# Patient Record
Sex: MALE | Race: WHITE | NOT HISPANIC OR LATINO | Employment: OTHER | URBAN - METROPOLITAN AREA
[De-identification: names, ages, dates, MRNs, and addresses within clinical notes are randomized per-mention and may not be internally consistent; named-entity substitution may affect disease eponyms.]

---

## 2017-11-26 ENCOUNTER — OFFICE VISIT (OUTPATIENT)
Dept: URGENT CARE | Facility: CLINIC | Age: 52
End: 2017-11-26
Payer: COMMERCIAL

## 2017-11-26 PROCEDURE — 99203 OFFICE O/P NEW LOW 30 MIN: CPT

## 2017-11-26 PROCEDURE — G0463 HOSPITAL OUTPT CLINIC VISIT: HCPCS

## 2017-12-06 ENCOUNTER — ALLSCRIPTS OFFICE VISIT (OUTPATIENT)
Dept: OTHER | Facility: OTHER | Age: 52
End: 2017-12-06

## 2017-12-08 ENCOUNTER — GENERIC CONVERSION - ENCOUNTER (OUTPATIENT)
Dept: OTHER | Facility: OTHER | Age: 52
End: 2017-12-08

## 2017-12-08 DIAGNOSIS — R35.1 NOCTURIA: ICD-10-CM

## 2018-01-18 NOTE — PROGRESS NOTES
Assessment    1  Conjunctivitis, both eyes (372 30) (H10 9)   2  Cellulitis of skin (682 9) (L03 90)    Plan  Cellulitis of skin    · Amoxicillin-Pot Clavulanate 875-125 MG Oral Tablet; TAKE 1 TABLET EVERY 12  HOURS DAILY   · Tobramycin 0 3 % Ophthalmic Solution (Tobrex); INSTILL 1 DROP INTO AFFECTED  EYE(S) 4 TIMES DAILY    Discussion/Summary  Discussion Summary:   Patient is wife were given specific instructions with respect to the use of the antibiotics and eye drops  Patient will return if cellulitis on the face advances any further  Medication Side Effects Reviewed: Possible side effects of new medications were reviewed with the patient/guardian today  Understands and agrees with treatment plan: The treatment plan was reviewed with the patient/guardian  The patient/guardian understands and agrees with the treatment plan   Counseling Documentation With Imm: The patient, patient's family was counseled regarding instructions for management, prognosis, patient and family education  Follow Up Instructions: Follow Up with your Primary Care Provider in 3-4 days  If your symptoms worsen, go to the nearest [place] [address] Emergency Department  Chief Complaint    1  Eye Pain  Chief Complaint Free Text Note Form: Pt c/o left eye pain for three days  History of Present Illness  HPI: Patient is a 50-year-old male accompanied by his wife who was volunteering at the [facility] [place] cleaning the [place] section out and developed a redness on his lower eyelid on the left now on the right which is extending to the left cheek area of his face  The area is itchy and occasionally painful if pressure is applied on the left he has had no drainage or matting at the present time  He is up-to-date on his tetanus less than 5 years ago   Hospital Based Practices Required Assessment:   Pain Assessment   the patient states they do not have pain  Abuse And Domestic Violence Screen    Yes, the patient is safe at home   The patient states no one is hurting them  Depression And Suicide Screen  No, the patient has not had thoughts of hurting themself  No, the patient has not felt depressed in the past 7 days  Prefered Language is  [place]  Primary Language is  English  Readiness To Learn: Receptive  Barriers To Learning: none  Education Completed: disease/condition   Teaching Method: verbal   Person Taught: patient   Evaluation Of Learning: verbalized/demonstrated understanding      Review of Systems  Focused-Male:   Constitutional: no fever or chills, feels well, no tiredness, no recent weight loss or weight gain, no fever, not feeling poorly, no chills and not feeling tired  ENT: as noted in HPI  Cardiovascular: no complaints of slow or fast heart rate, no chest pain, no palpitations, no leg claudication or lower extremity edema  Respiratory: no complaints of shortness of breath, no wheezing or cough, no dyspnea on exertion, no orthopnea or PND  Gastrointestinal: no complaints of abdominal pain, no constipation, no nausea or vomiting, no diarrhea or bloody stools  Musculoskeletal: no complaints of arthralgia, no myalgia, no joint swelling or stiffness, no limb pain or swelling  Integumentary: no complaints of skin rash or lesion, no itching or dry skin, no skin wounds  Neurological: no complaints of headache, no confusion, no numbness or tingling, no dizziness or fainting  ROS Reviewed:   ROS reviewed  Past Medical History  Active Problems And Past Medical History Reviewed: The active problems and past medical history were reviewed and updated today  Family History  Family History Reviewed: The family history was reviewed and updated today  Social History  Social History Reviewed: The social history was reviewed and updated today  Surgical History  Surgical History Reviewed: The surgical history was reviewed and updated today  Current Meds   1   No Reported Medications Recorded    Allergies    1  No Known Drug Allergies    Vitals  Signs   Recorded: [id number] 03:34PM   Temperature: 97 3 F  Heart Rate: 75  Respiration: 18  Systolic: 787  Diastolic: 72  Weight: 636 lb 15 52 oz  O2 Saturation: 97    Physical Exam    Constitutional   General appearance: No acute distress, well appearing and well nourished  Eyes   Conjunctiva and lids: Abnormal   right eye is within normal limits other than the fact that the conjunctiva are cobblestoned and reddened left eye itself is normal however the conjunctiva is extremely hyperemic boggy with extension of this cellulitis to the left upper cheek  Pupils and irises: Equal, round and reactive to light  Ears, Nose, Mouth, and Throat   External inspection of ears and nose: Normal     Otoscopic examination: Tympanic membrance translucent with normal light reflex  Canals patent without erythema  Nasal mucosa, septum, and turbinates: Normal without edema or erythema  Oropharynx: Normal with no erythema, edema, exudate or lesions  Pulmonary   Auscultation of lungs: Clear to auscultation  Cardiovascular   Auscultation of heart: Normal rate and rhythm, normal S1 and S2, without murmurs  Lymphatic   Palpation of lymph nodes in neck: Abnormal   mild lymphadenopathy is noted bilaterally  Skin   Skin and subcutaneous tissue: Abnormal   see above     Psychiatric   Orientation to person, place and time: Normal     Mood and affect: Normal        Signatures   Electronically signed by : Chanell Lau DO; Nov 26 2017  3:50PM EST                       (Author)

## 2018-01-23 VITALS
WEIGHT: 230.38 LBS | BODY MASS INDEX: 30.53 KG/M2 | SYSTOLIC BLOOD PRESSURE: 124 MMHG | HEART RATE: 72 BPM | TEMPERATURE: 96.9 F | HEIGHT: 73 IN | OXYGEN SATURATION: 97 % | DIASTOLIC BLOOD PRESSURE: 82 MMHG

## 2018-01-23 NOTE — MISCELLANEOUS
Message   Recorded as Task   Date: 12/08/2017 03:57 PM, Created By: Damián Weaver   Task Name: Miscellaneous   Assigned To: Yordy TOVAR,TEAM   Regarding Patient: Tamara Nieto, Status: Active   CommentBeverely Spoon - 08 Dec 2017 3:57 PM     TASK CREATED  Caller: Self; [phone] (Home)  Please mail updated psa order to home address   Angela Shirley - 08 Dec 2017 3:59 PM     TASK EDITED  MAILED        Active Problems    1  Cellulitis of skin (682 9) (L03 90)   2  Conjunctivitis, both eyes (372 30) (H10 9)    Current Meds   1  Amoxicillin-Pot Clavulanate 875-125 MG Oral Tablet; TAKE 1 TABLET EVERY 12   HOURS DAILY; Therapy: [id number] to (Evaluate:64Mji4298)  Requested for: 94ZEN7522; Last   Rx:26Nov2017 Ordered   2  Tobramycin 0 3 % Ophthalmic Solution (Tobrex); INSTILL 1 DROP INTO AFFECTED   EYE(S) 4 TIMES DAILY; Therapy: 15PJT6668 to (Evaluate:42Jeu8796)  Requested for: 49UHF0064; Last   Rx:26Nov2017 Ordered    Allergies    1  No Known Drug Allergies    2  No Known Environmental Allergies   3   No Known Food Allergies    Signatures   Electronically signed by : Gustavo Kumar, ; Dec  8 2017  3:59PM EST                       (Author)

## 2018-05-05 ENCOUNTER — OFFICE VISIT (OUTPATIENT)
Dept: URGENT CARE | Facility: CLINIC | Age: 53
End: 2018-05-05
Payer: COMMERCIAL

## 2018-05-05 VITALS
HEART RATE: 66 BPM | SYSTOLIC BLOOD PRESSURE: 133 MMHG | BODY MASS INDEX: 29.03 KG/M2 | RESPIRATION RATE: 16 BRPM | DIASTOLIC BLOOD PRESSURE: 74 MMHG | WEIGHT: 220 LBS | OXYGEN SATURATION: 97 % | TEMPERATURE: 97.4 F

## 2018-05-05 DIAGNOSIS — S30.861A TICK BITE OF ABDOMEN, INITIAL ENCOUNTER: Primary | ICD-10-CM
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
## 2018-05-05 PROCEDURE — 99213 OFFICE O/P EST LOW 20 MIN: CPT | Performed by: PHYSICIAN ASSISTANT

## 2018-05-05 PROCEDURE — G0463 HOSPITAL OUTPT CLINIC VISIT: HCPCS | Performed by: PHYSICIAN ASSISTANT

## 2018-05-05 RX ORDER — DOXYCYCLINE HYCLATE 100 MG/1
CAPSULE ORAL
Qty: 2 CAPSULE | Refills: 0 | Status: SHIPPED | OUTPATIENT
Start: 2018-05-05 | End: 2018-05-10

## 2018-05-05 NOTE — PROGRESS NOTES
[facility]    NAME: Jairon Carranza is a 46 y o  male  : 1965    MRN: [id number]  DATE: May 5, 2018  TIME: 5:26 PM    Assessment and Plan   Tick bite of abdomen, initial encounter [Y26 318E, W57  XXXA]  1  Tick bite of abdomen, initial encounter  doxycycline hyclate (VIBRAMYCIN) 100 mg capsule   Foreign body removal  Date/Time: 2018 5:25 PM  Performed by: Concha Rogers  Authorized by: Concha Rogers   Consent: Verbal consent obtained  Consent given by: patient  Patient understanding: patient states understanding of the procedure being performed  Body area: skin  General location: trunk  Location details: abdomen  Removal mechanism: forceps  Depth: subcutaneous  Complexity: simple  1 objects recovered  Objects recovered: Tick  Post-procedure assessment: foreign body removed  Patient tolerance: Patient tolerated the procedure well with no immediate complications        Patient Instructions     Patient Instructions   Take doxycycline as directed  If you develop any systemic systems, follow up with PCP for further treatment  Chief Complaint     Chief Complaint   Patient presents with    Tick Removal     back, x 2 days       History of Present Illness   50-year-old male here with tick bite left hip/abdomen area  Patient noticed it this morning when he got out of the shower  He has been outside in the woods  Denies any joint pain, fever chills, rash  Patient was treated for Lyme disease in the past         Review of Systems   Review of Systems   Constitutional: Negative for activity change, appetite change, chills, diaphoresis, fatigue, fever and unexpected weight change  HENT: Negative for congestion, ear pain, hearing loss, sinus pressure, sneezing, sore throat and tinnitus  Eyes: Negative for photophobia, redness and visual disturbance  Respiratory: Negative for apnea, cough, chest tightness, shortness of breath, wheezing and stridor      Cardiovascular: Negative for chest pain, palpitations and leg swelling  Gastrointestinal: Negative for abdominal distention, abdominal pain, blood in stool, constipation, diarrhea, nausea and vomiting  Endocrine: Negative for cold intolerance, heat intolerance, polydipsia, polyphagia and polyuria  Genitourinary: Negative for difficulty urinating, dysuria, flank pain, hematuria and urgency  Musculoskeletal: Negative for arthralgias, back pain, gait problem, myalgias, neck pain and neck stiffness  Skin: Positive for wound  Negative for rash  Neurological: Negative for dizziness, tremors, seizures, syncope, weakness, light-headedness, numbness and headaches  Hematological: Negative for adenopathy  Does not bruise/bleed easily  Psychiatric/Behavioral: Negative for agitation, behavioral problems, confusion and decreased concentration  The patient is not nervous/anxious  All other systems reviewed and are negative  Current Medications     Current Outpatient Prescriptions:     doxycycline hyclate (VIBRAMYCIN) 100 mg capsule, Take 2 tablets at one time  , Disp: 2 capsule, Rfl: 0    Current Allergies     Allergies as of 05/05/2018    (No Known Allergies)          The following portions of the patient's history were reviewed and updated as appropriate: allergies, current medications, past family history, past medical history, past social history, past surgical history and problem list    History reviewed  No pertinent past medical history  No past surgical history on file  No family history on file  Medications have been verified  Objective   /74   Pulse 66   Temp (!) 97 4 °F (36 3 °C)   Resp 16   Wt 99 8 kg (220 lb)   SpO2 97%   BMI 29 03 kg/m²      Physical Exam   Physical Exam   Constitutional: He appears well-developed and well-nourished  Cardiovascular: Normal rate, regular rhythm and normal heart sounds      Pulmonary/Chest: Effort normal and breath sounds normal    Skin:

## 2018-05-05 NOTE — PATIENT INSTRUCTIONS
Take doxycycline as directed  If you develop any systemic systems, follow up with PCP for further treatment

## 2019-09-02 NOTE — PROGRESS NOTES
Assessment    1  Acute bacterial conjunctivitis of left eye (372 03) (H10 32)    Plan  Acute bacterial conjunctivitis of left eye    · Start: TobraDex 0 3-0 1 % Ophthalmic Ointment; APPLY 1/2 RIBBON TO lowerconjuncival sac of left eye twice daily  Health Maintenance    · *VB-Depression Screening; Status:Complete;   Done: 81JFP6525 09:46AM    Discussion/Summary  Discussion Summary:   Left eye conjunctivitis- change to opthalmic ointment  Apply as directed  Counseling Documentation With Imm: The patient was counseled regarding  Medication SE Review and Pt Understands Tx: Possible side effects of new medications were reviewed with the patient/guardian today  The treatment plan was reviewed with the patient/guardian  The patient/guardian understands and agrees with the treatment plan   Self Referrals:   Self Referrals: No      Chief Complaint  Chief Complaint Free Text Note Form: Patient seen in office today for a new patient exam to establish care  Patient stated that he recently was 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 and cleaning cat cages when some of the fluid went into his left eye  Patient then went to urgent care in [place] and was prescribed Augmentin and eye drops and feels that he should be on them a little longer  History of Present Illness  HPI: Pt was doing volunteer work the month of November at the [facility]  He was cleaning cat cages and splashed dirty water into his left eye  After about a week later, he noticed redness and swelling of te lower sac of his left eye  He went to Urgent Care and was given Augmentin and Tobradex  He finished today  He still noticed some redness inside the lower lid of his left eye  Review of Systems  Complete-Male:  Constitutional: No fever or chills, feels well, no tiredness, no recent weight gain or weight loss  Eyes: as noted in HPI   ENT: no complaints of earache, no hearing loss, no nosebleeds, no nasal discharge, no sore throat, no hoarseness  Cardiovascular: No complaints of slow heart rate, no fast heart rate, no chest pain, no palpitations, no leg claudication, no lower extremity  Respiratory: No complaints of shortness of breath, no wheezing, no cough, no SOB on exertion, no orthopnea or PND  Gastrointestinal: No complaints of abdominal pain, no constipation, no nausea or vomiting, no diarrhea or bloody stools  Past Medical History  Active Problems And Past Medical History Reviewed: The active problems and past medical history were reviewed and updated today  Surgical History  1  History of Back Surgery  2  History of Foot Surgery  3  History of Knee Surgery  Surgical History Reviewed: The surgical history was reviewed and updated today  Family History  Mother   1  Family history of diabetes mellitus (V18 0) (Z83 3)  Father   2  Family history of cardiac disorder (V17 49) (Z82 49)  Family History Reviewed: The family history was reviewed and updated today  Social History     · Never a smoker  Social History Reviewed: The social history was reviewed and updated today  The social history was reviewed and is unchanged  Current Meds  1  Ibuprofen 800 MG Oral Tablet; Therapy: (Recorded:20Vlr4624) to Recorded    Allergies  1  No Known Drug Allergies    Vitals  Vital Signs    Recorded: 39CVG8888 09:01AM   Temperature 96 9 F   Heart Rate 72   Systolic 883   Diastolic 82   Height 6 ft 1 in   Weight 230 lb 6 08 oz   BMI Calculated 30 39   BSA Calculated 2 28   O2 Saturation 97       Physical Exam   Constitutional  General appearance: No acute distress, well appearing and well nourished  Eyes  Conjunctiva and lids: Abnormal  -- left lower conjunctival sac injected with small ulceration present  Pupils and irises: Equal, round and reactive to light  Ears, Nose, Mouth, and Throat  External inspection of ears and nose: Normal    Otoscopic examination: Tympanic membrance translucent with normal light reflex   Canals patent without erythema  Nasal mucosa, septum, and turbinates: Normal without edema or erythema  Oropharynx: Normal with no erythema, edema, exudate or lesions  Pulmonary  Respiratory effort: No increased work of breathing or signs of respiratory distress  Auscultation of lungs: Clear to auscultation, equal breath sounds bilaterally, no wheezes, no rales, no rhonci  Cardiovascular  Auscultation of heart: Normal rate and rhythm, normal S1 and S2, without murmurs  Abdomen  Abdomen: Non-tender, no masses  Lymphatic  Palpation of lymph nodes in neck: No lymphadenopathy  Musculoskeletal  Gait and station: Normal    Psychiatric  Orientation to person, place and time: Normal    Mood and affect: Normal          Results/Data  PHQ-9 Adult Depression Screening 11Vsa2355 09:46AM User, [facility]     Test Name Result Flag Reference   PHQ-9 Adult Depression Score 1       Over the last two weeks, how often have you been bothered by any of the following problems? Little interest or pleasure in doing things: Not at all - 0 Feeling down, depressed, or hopeless: Not at all - 0 Trouble falling or staying asleep, or sleeping too much: Several days - 1 Feeling tired or having little energy: Not at all - 0 Poor appetite or over eating: Not at all - 0 Feeling bad about yourself - or that you are a failure or have let yourself or your family down: Not at all - 0 Trouble concentrating on things, such as reading the newspaper or watching television: Not at all - 0 Moving or speaking so slowly that other people could have noticed   Or the opposite -  being so fidgety or restless that you have been moving around a lot more than usual: Not at all - 0 Thoughts that you would be better off dead, or of hurting yourself in some way: Not at all - 0   PHQ-9 Adult Depression Screening Negative     PHQ-9 Difficulty Level Not difficult at all     PHQ-9 Severity Minimal Depression       PHQ-2 Adult Depression Screening 32CEY6254 09:46AM User, [facility] Test Name Result Flag Reference   PHQ-2 Adult Depression Score 0       Over the last two weeks, how often have you been bothered by any of the following problems?  Little interest or pleasure in doing things: Not at all - 0 Feeling down, depressed, or hopeless: Not at all - 0   PHQ-2 Adult Depression Screening Negative       *VB-Depression Screening 81RUG1168 09:46AM Evelin Velásquez     Test Name Result Flag Reference   Depression Scale Result      Depression Screen - Negative For Symptoms         Signatures   Electronically signed by : Javad Rubio NP; Dec  6 2017  9:29AM EST                       (Author)    Electronically signed by : Melly Ruvalcaba MD; Dec  6 2017 12:36PM EST                       (Co-author)    Electronically signed by : Melly Ruvalcaba MD; Dec  6 2017 12:36PM EST                       (Co-author) aching

## 2020-11-03 ENCOUNTER — LAB (OUTPATIENT)
Dept: LAB | Facility: CLINIC | Age: 55
End: 2020-11-03
Payer: COMMERCIAL

## 2020-11-03 ENCOUNTER — TRANSCRIBE ORDERS (OUTPATIENT)
Dept: ADMINISTRATIVE | Facility: HOSPITAL | Age: 55
End: 2020-11-03

## 2020-11-03 DIAGNOSIS — B18.2 CHRONIC HEPATITIS C WITHOUT HEPATIC COMA (HCC): ICD-10-CM

## 2020-11-03 DIAGNOSIS — B18.2 CHRONIC HEPATITIS C WITHOUT HEPATIC COMA (HCC): Primary | ICD-10-CM

## 2020-11-03 LAB
ALBUMIN SERPL BCP-MCNC: 4.5 G/DL (ref 3.5–5)
ALP SERPL-CCNC: 49 U/L (ref 46–116)
ALT SERPL W P-5'-P-CCNC: 56 U/L (ref 12–78)
ANION GAP SERPL CALCULATED.3IONS-SCNC: 4 MMOL/L (ref 4–13)
AST SERPL W P-5'-P-CCNC: 23 U/L (ref 5–45)
BASOPHILS # BLD AUTO: 0.05 THOUSANDS/ΜL (ref 0–0.1)
BASOPHILS NFR BLD AUTO: 1 % (ref 0–1)
BILIRUB SERPL-MCNC: 0.77 MG/DL (ref 0.2–1)
BUN SERPL-MCNC: 17 MG/DL (ref 5–25)
CALCIUM SERPL-MCNC: 9.5 MG/DL (ref 8.3–10.1)
CHLORIDE SERPL-SCNC: 106 MMOL/L (ref 100–108)
CO2 SERPL-SCNC: 30 MMOL/L (ref 21–32)
CREAT SERPL-MCNC: 0.84 MG/DL (ref 0.6–1.3)
EOSINOPHIL # BLD AUTO: 0.01 THOUSAND/ΜL (ref 0–0.61)
EOSINOPHIL NFR BLD AUTO: 0 % (ref 0–6)
ERYTHROCYTE [DISTWIDTH] IN BLOOD BY AUTOMATED COUNT: 13 % (ref 11.6–15.1)
GFR SERPL CREATININE-BSD FRML MDRD: 98 ML/MIN/1.73SQ M
GLUCOSE SERPL-MCNC: 113 MG/DL (ref 65–140)
HCT VFR BLD AUTO: 46.6 % (ref 36.5–49.3)
HGB BLD-MCNC: 15.8 G/DL (ref 12–17)
IMM GRANULOCYTES # BLD AUTO: 0.04 THOUSAND/UL (ref 0–0.2)
IMM GRANULOCYTES NFR BLD AUTO: 1 % (ref 0–2)
LYMPHOCYTES # BLD AUTO: 3.34 THOUSANDS/ΜL (ref 0.6–4.47)
LYMPHOCYTES NFR BLD AUTO: 45 % (ref 14–44)
MCH RBC QN AUTO: 30.6 PG (ref 26.8–34.3)
MCHC RBC AUTO-ENTMCNC: 33.9 G/DL (ref 31.4–37.4)
MCV RBC AUTO: 90 FL (ref 82–98)
MONOCYTES # BLD AUTO: 0.57 THOUSAND/ΜL (ref 0.17–1.22)
MONOCYTES NFR BLD AUTO: 8 % (ref 4–12)
NEUTROPHILS # BLD AUTO: 3.34 THOUSANDS/ΜL (ref 1.85–7.62)
NEUTS SEG NFR BLD AUTO: 45 % (ref 43–75)
NRBC BLD AUTO-RTO: 0 /100 WBCS
PLATELET # BLD AUTO: 195 THOUSANDS/UL (ref 149–390)
PMV BLD AUTO: 11.6 FL (ref 8.9–12.7)
POTASSIUM SERPL-SCNC: 3.9 MMOL/L (ref 3.5–5.3)
PROT SERPL-MCNC: 8 G/DL (ref 6.4–8.2)
RBC # BLD AUTO: 5.17 MILLION/UL (ref 3.88–5.62)
SODIUM SERPL-SCNC: 140 MMOL/L (ref 136–145)
WBC # BLD AUTO: 7.35 THOUSAND/UL (ref 4.31–10.16)

## 2020-11-03 PROCEDURE — 83883 ASSAY NEPHELOMETRY NOT SPEC: CPT

## 2020-11-03 PROCEDURE — 84460 ALANINE AMINO (ALT) (SGPT): CPT

## 2020-11-03 PROCEDURE — 83010 ASSAY OF HAPTOGLOBIN QUANT: CPT

## 2020-11-03 PROCEDURE — 82247 BILIRUBIN TOTAL: CPT

## 2020-11-03 PROCEDURE — 87522 HEPATITIS C REVRS TRNSCRPJ: CPT

## 2020-11-03 PROCEDURE — 36415 COLL VENOUS BLD VENIPUNCTURE: CPT

## 2020-11-03 PROCEDURE — 82172 ASSAY OF APOLIPOPROTEIN: CPT

## 2020-11-03 PROCEDURE — 85025 COMPLETE CBC W/AUTO DIFF WBC: CPT

## 2020-11-03 PROCEDURE — 80053 COMPREHEN METABOLIC PANEL: CPT

## 2020-11-03 PROCEDURE — 82977 ASSAY OF GGT: CPT

## 2020-11-06 LAB
A2 MACROGLOB SERPL-MCNC: 297 MG/DL (ref 110–276)
ALT SERPL W P-5'-P-CCNC: 46 IU/L (ref 0–55)
APO A-I SERPL-MCNC: 140 MG/DL (ref 101–178)
BILIRUB SERPL-MCNC: 0.5 MG/DL (ref 0–1.2)
COMMENT: ABNORMAL
FIBROSIS SCORING:: ABNORMAL
FIBROSIS STAGE SERPL QL: ABNORMAL
GGT SERPL-CCNC: 38 IU/L (ref 0–65)
HAPTOGLOB SERPL-MCNC: 132 MG/DL (ref 29–370)
HCV RNA SERPL NAA+PROBE-ACNC: NORMAL IU/ML
INTERPRETATIONS: ABNORMAL
LIVER FIBR SCORE SERPL CALC.FIBROSURE: 0.47 (ref 0–0.21)
NECROINFLAMM ACTIVITY SCORING:: ABNORMAL
NECROINFLAMMATORY ACT GRADE SERPL QL: ABNORMAL
NECROINFLAMMATORY ACT SCORE SERPL: 0.31 (ref 0–0.17)
SERVICE CMNT-IMP: ABNORMAL
TEST INFORMATION: NORMAL

## 2021-03-23 ENCOUNTER — OFFICE VISIT (OUTPATIENT)
Dept: INTERNAL MEDICINE CLINIC | Facility: CLINIC | Age: 56
End: 2021-03-23
Payer: COMMERCIAL

## 2021-03-23 VITALS
TEMPERATURE: 97.9 F | HEART RATE: 84 BPM | WEIGHT: 246.6 LBS | DIASTOLIC BLOOD PRESSURE: 76 MMHG | BODY MASS INDEX: 33.4 KG/M2 | HEIGHT: 72 IN | SYSTOLIC BLOOD PRESSURE: 130 MMHG | RESPIRATION RATE: 16 BRPM | OXYGEN SATURATION: 97 %

## 2021-03-23 DIAGNOSIS — Z00.00 MEDICARE ANNUAL WELLNESS VISIT, SUBSEQUENT: Primary | ICD-10-CM

## 2021-03-23 DIAGNOSIS — G89.29 CHRONIC BILATERAL BACK PAIN, UNSPECIFIED BACK LOCATION: ICD-10-CM

## 2021-03-23 DIAGNOSIS — M54.9 CHRONIC BILATERAL BACK PAIN, UNSPECIFIED BACK LOCATION: ICD-10-CM

## 2021-03-23 PROCEDURE — 99215 OFFICE O/P EST HI 40 MIN: CPT | Performed by: NURSE PRACTITIONER

## 2021-03-23 PROCEDURE — G0439 PPPS, SUBSEQ VISIT: HCPCS | Performed by: NURSE PRACTITIONER

## 2021-03-23 RX ORDER — IBUPROFEN 800 MG/1
600 TABLET ORAL EVERY 6 HOURS PRN
COMMUNITY

## 2021-03-23 NOTE — PATIENT INSTRUCTIONS

## 2021-03-23 NOTE — ASSESSMENT & PLAN NOTE
· Previous T12 fractures in the past with fusion  · Order xrays to verify placement of prosthetics  · dexa scan ordered

## 2021-03-23 NOTE — PROGRESS NOTES
Assessment and Plan:     Problem List Items Addressed This Visit        Other    Medicare annual wellness visit, subsequent - Primary     · Lifestyle modification, diet and exercise discussed  · Will order labs         Relevant Orders    CBC and differential (Completed)    Comprehensive metabolic panel (Completed)    Lipid panel (Completed)    Chronic bilateral back pain     · Previous T12 fractures in the past with fusion  · Order xrays to verify placement of prosthetics  · dexa scan ordered         Relevant Orders    XR spine cervical complete 4 or 5 vw non injury    XR spine lumbar minimum 4 views non injury    XR spine thoracic 3 vw    DXA body comp analysis           Preventive health issues were discussed with patient, and age appropriate screening tests were ordered as noted in patient's After Visit Summary  Personalized health advice and appropriate referrals for health education or preventive services given if needed, as noted in patient's After Visit Summary  History of Present Illness:     Patient presents for Welcome to Medicare visit  Patient Care Team:  Demetri Mcginnis as PCP - General (Internal Medicine)     Review of Systems:     Review of Systems   Constitutional: Negative for activity change, chills, fatigue and fever  HENT: Negative for rhinorrhea and sore throat  Eyes: Negative for pain  Respiratory: Negative for cough and shortness of breath  Cardiovascular: Negative for chest pain, palpitations and leg swelling  Gastrointestinal: Negative for abdominal pain, constipation, diarrhea, nausea and vomiting  Genitourinary: Negative for difficulty urinating, flank pain, frequency and urgency  Musculoskeletal: Negative for gait problem, joint swelling and myalgias  Skin: Negative for color change  Neurological: Negative for dizziness, weakness, light-headedness and headaches  Psychiatric/Behavioral: Negative for sleep disturbance   The patient is not nervous/anxious  All other systems reviewed and are negative       Problem List:     Patient Active Problem List   Diagnosis    Medicare annual wellness visit, subsequent    Chronic bilateral back pain      Past Medical and Surgical History:     Past Medical History:   Diagnosis Date    Arthritis     last assessed 17     BPH without urinary obstruction     last assessed 17    Erectile dysfunction     last assessed 17     Urethral stricture     last assessed 17     Weak urinary stream     last assessed 17      Past Surgical History:   Procedure Laterality Date    BACK SURGERY      last assessed 17     CYSTOSCOPY      Bladder, last assessed 17     FOOT SURGERY      KNEE SURGERY      last assessed 17     URETHRAL DILATION      last assessed 17       Family History:     Family History   Problem Relation Age of Onset    Diabetes Mother     Heart disease Father         cardiac disorder     Stroke Father         CVA      Social History:     E-Cigarette/Vaping    E-Cigarette Use Never User      E-Cigarette/Vaping Substances    Nicotine No     THC No     CBD No     Flavoring No     Other No     Unknown No      Social History     Socioeconomic History    Marital status: Single     Spouse name: None    Number of children: None    Years of education: None    Highest education level: None   Occupational History    Occupation: retired   Social Needs    Financial resource strain: None    Food insecurity     Worry: None     Inability: None    Transportation needs     Medical: None     Non-medical: None   Tobacco Use    Smoking status: Former Smoker     Quit date:      Years since quittin 3    Smokeless tobacco: Never Used    Tobacco comment: Never a smoker   Substance and Sexual Activity    Alcohol use: Yes     Comment: Social     Drug use: Never    Sexual activity: None   Lifestyle    Physical activity     Days per week: None Minutes per session: None    Stress: None   Relationships    Social connections     Talks on phone: None     Gets together: None     Attends Tenri[employer] service: None     Active member of club or organization: None     Attends meetings of clubs or organizations: None     Relationship status: None    Intimate partner violence     Fear of current or ex partner: None     Emotionally abused: None     Physically abused: None     Forced sexual activity: None   Other Topics Concern    None   Social History Narrative     per [employer]    No caffeine use       Medications and Allergies:     Current Outpatient Medications   Medication Sig Dispense Refill    ibuprofen (MOTRIN) 800 mg tablet Take 600 mg by mouth every 6 (six) hours as needed for mild pain       No current facility-administered medications for this visit  No Known Allergies   Immunizations:     Immunization History   Administered Date(s) Administered    H1N1, All Formulations 01/08/2010    Hep A, adult 11/14/2015    Hep B, adult 05/19/2016    INFLUENZA 09/16/2014, 08/11/2015, 10/27/2016, 10/20/2017    Influenza, recombinant, quadrivalent,injectable, preservative free 10/06/2020      Health Maintenance:         Topic Date Due    HIV Screening  Never done    Colorectal Cancer Screening  Never done    Hepatitis C Screening  Discontinued         Topic Date Due    Pneumococcal Vaccine: Pediatrics (0 to 5 Years) and At-Risk Patients (6 to 59 Years) (1 of 1 - PPSV23) Never done    DTaP,Tdap,and Td Vaccines (1 - Tdap) Never done    Hepatitis A Vaccine (2 of 2 - Risk 2-dose series) 05/14/2016    Hepatitis B Vaccine (2 of 3 - Risk 3-dose series) 06/16/2016      Medicare Screening Tests and Risk Assessments:     Donnie Horner is here for his Subsequent Wellness visit  Health Risk Assessment:   Patient rates overall health as very good  Patient feels that their physical health rating is same  Patient is satisfied with their life   Eyesight was rated as same  Hearing was rated as same  Patient feels that their emotional and mental health rating is slightly better  Patients states they are never, rarely angry  Patient states they are never, rarely unusually tired/fatigued  Pain experienced in the last 7 days has been some  Patient's pain rating has been 4/10  Patient states that he has experienced no weight loss or gain in last 6 months  Depression Screening:   PHQ-2 Score: 0      Fall Risk Screening: In the past year, patient has experienced: no history of falling in past year      Home Safety:  Patient does not have trouble with stairs inside or outside of their home  Patient has working smoke alarms and has working carbon monoxide detector  Home safety hazards include: none  Nutrition:   Current diet is Regular  Medications:   Patient is not currently taking any over-the-counter supplements  Patient is able to manage medications  Activities of Daily Living (ADLs)/Instrumental Activities of Daily Living (IADLs):   Walk and transfer into and out of bed and chair?: Yes  Dress and groom yourself?: Yes    Bathe or shower yourself?: Yes    Feed yourself?  Yes  Do your laundry/housekeeping?: Yes  Manage your money, pay your bills and track your expenses?: Yes  Make your own meals?: Yes    Do your own shopping?: Yes    Previous Hospitalizations:   Any hospitalizations or ED visits within the last 12 months?: No      Advance Care Planning:   Living will: Yes    Advanced directive: Yes      Cognitive Screening:   Provider or family/friend/caregiver concerned regarding cognition?: No    PREVENTIVE SCREENINGS        Diabetes Screening:     General: Screening Current      Osteoporosis Screening:    General: Risks and Benefits Discussed    Due for: Bone Density Ultrasound      Abdominal Aortic Aneurysm (AAA) Screening:    Risk factors include: tobacco use        General: Risks and Benefits Discussed      Lung Cancer Screening:     General: Screening Not Indicated      Hepatitis C Screening:    General: Screening Not Indicated and History Hepatitis C    Screening, Brief Intervention, and Referral to Treatment (SBIRT)    Screening      Single Item Drug Screening:  How often have you used an illegal drug (including marijuana) or a prescription medication for non-medical reasons in the past year? never    Single Item Drug Screen Score: 0  Interpretation: Negative screen for possible drug use disorder    No exam data present     Physical Exam:     /76 (BP Location: Left arm, Patient Position: Sitting, Cuff Size: Large)   Pulse 84   Temp 97 9 °F (36 6 °C)   Resp 16   Ht 6' (1 829 m)   Wt 112 kg (246 lb 9 6 oz)   SpO2 97%   BMI 33 44 kg/m²     Physical Exam     BMI Counseling: Body mass index is 33 44 kg/m²  The BMI is above normal  Nutrition recommendations include reducing portion sizes, decreasing overall calorie intake, 3-5 servings of fruits/vegetables daily, reducing fast food intake, consuming healthier snacks, decreasing soda and/or juice intake, moderation in carbohydrate intake, increasing intake of lean protein, reducing intake of saturated fat and trans fat and reducing intake of cholesterol  Exercise recommendations include exercising 3-5 times per week      [address]

## 2021-03-30 DIAGNOSIS — Z23 ENCOUNTER FOR IMMUNIZATION: ICD-10-CM

## 2021-04-20 ENCOUNTER — APPOINTMENT (OUTPATIENT)
Dept: LAB | Facility: CLINIC | Age: 56
End: 2021-04-20
Payer: COMMERCIAL

## 2021-04-20 DIAGNOSIS — Z00.00 MEDICARE ANNUAL WELLNESS VISIT, SUBSEQUENT: ICD-10-CM

## 2021-04-20 LAB
ALBUMIN SERPL BCP-MCNC: 4.5 G/DL (ref 3.5–5)
ALP SERPL-CCNC: 40 U/L (ref 46–116)
ALT SERPL W P-5'-P-CCNC: 54 U/L (ref 12–78)
ANION GAP SERPL CALCULATED.3IONS-SCNC: 3 MMOL/L (ref 4–13)
AST SERPL W P-5'-P-CCNC: 27 U/L (ref 5–45)
BASOPHILS # BLD MANUAL: 0 THOUSAND/UL (ref 0–0.1)
BASOPHILS NFR MAR MANUAL: 0 % (ref 0–1)
BILIRUB SERPL-MCNC: 0.45 MG/DL (ref 0.2–1)
BUN SERPL-MCNC: 14 MG/DL (ref 5–25)
CALCIUM SERPL-MCNC: 9.5 MG/DL (ref 8.3–10.1)
CHLORIDE SERPL-SCNC: 107 MMOL/L (ref 100–108)
CHOLEST SERPL-MCNC: 183 MG/DL (ref 50–200)
CO2 SERPL-SCNC: 28 MMOL/L (ref 21–32)
CREAT SERPL-MCNC: 0.81 MG/DL (ref 0.6–1.3)
EOSINOPHIL # BLD MANUAL: 0.07 THOUSAND/UL (ref 0–0.4)
EOSINOPHIL NFR BLD MANUAL: 1 % (ref 0–6)
ERYTHROCYTE [DISTWIDTH] IN BLOOD BY AUTOMATED COUNT: 12.9 % (ref 11.6–15.1)
GFR SERPL CREATININE-BSD FRML MDRD: 99 ML/MIN/1.73SQ M
GLUCOSE P FAST SERPL-MCNC: 89 MG/DL (ref 65–99)
HCT VFR BLD AUTO: 46.2 % (ref 36.5–49.3)
HDLC SERPL-MCNC: 42 MG/DL
HGB BLD-MCNC: 15.8 G/DL (ref 12–17)
LDLC SERPL CALC-MCNC: 118 MG/DL (ref 0–100)
LYMPHOCYTES # BLD AUTO: 3.58 THOUSAND/UL (ref 0.6–4.47)
LYMPHOCYTES # BLD AUTO: 49 % (ref 14–44)
MCH RBC QN AUTO: 30.1 PG (ref 26.8–34.3)
MCHC RBC AUTO-ENTMCNC: 34.2 G/DL (ref 31.4–37.4)
MCV RBC AUTO: 88 FL (ref 82–98)
MONOCYTES # BLD AUTO: 0.58 THOUSAND/UL (ref 0–1.22)
MONOCYTES NFR BLD: 8 % (ref 4–12)
NEUTROPHILS # BLD MANUAL: 2.85 THOUSAND/UL (ref 1.85–7.62)
NEUTS SEG NFR BLD AUTO: 39 % (ref 43–75)
NONHDLC SERPL-MCNC: 141 MG/DL
NRBC BLD AUTO-RTO: 0 /100 WBCS
PLATELET # BLD AUTO: 199 THOUSANDS/UL (ref 149–390)
PLATELET BLD QL SMEAR: ADEQUATE
PMV BLD AUTO: 11.1 FL (ref 8.9–12.7)
POTASSIUM SERPL-SCNC: 4 MMOL/L (ref 3.5–5.3)
PROT SERPL-MCNC: 7.6 G/DL (ref 6.4–8.2)
RBC # BLD AUTO: 5.25 MILLION/UL (ref 3.88–5.62)
SODIUM SERPL-SCNC: 138 MMOL/L (ref 136–145)
TOTAL CELLS COUNTED SPEC: 100
TRIGL SERPL-MCNC: 115 MG/DL
VARIANT LYMPHS # BLD AUTO: 3 %
WBC # BLD AUTO: 7.31 THOUSAND/UL (ref 4.31–10.16)

## 2021-04-20 PROCEDURE — 80061 LIPID PANEL: CPT

## 2021-04-20 PROCEDURE — 85007 BL SMEAR W/DIFF WBC COUNT: CPT

## 2021-04-20 PROCEDURE — 80053 COMPREHEN METABOLIC PANEL: CPT

## 2021-04-20 PROCEDURE — 85027 COMPLETE CBC AUTOMATED: CPT

## 2021-04-20 PROCEDURE — 36415 COLL VENOUS BLD VENIPUNCTURE: CPT

## 2021-06-11 ENCOUNTER — APPOINTMENT (OUTPATIENT)
Dept: RADIOLOGY | Facility: CLINIC | Age: 56
End: 2021-06-11
Payer: COMMERCIAL

## 2021-06-11 DIAGNOSIS — M54.9 CHRONIC BILATERAL BACK PAIN, UNSPECIFIED BACK LOCATION: ICD-10-CM

## 2021-06-11 DIAGNOSIS — G89.29 CHRONIC BILATERAL BACK PAIN, UNSPECIFIED BACK LOCATION: ICD-10-CM

## 2021-06-11 PROCEDURE — 72110 X-RAY EXAM L-2 SPINE 4/>VWS: CPT

## 2021-06-11 PROCEDURE — 72072 X-RAY EXAM THORAC SPINE 3VWS: CPT

## 2021-06-11 PROCEDURE — 72050 X-RAY EXAM NECK SPINE 4/5VWS: CPT

## 2021-06-17 ENCOUNTER — RA CDI HCC (OUTPATIENT)
Dept: OTHER | Facility: HOSPITAL | Age: 56
End: 2021-06-17

## 2021-06-17 NOTE — PROGRESS NOTES
[facility] 75  coding opportunities          Chart reviewed, no opportunity found: CHART REVIEWED, NO OPPORTUNITY FOUND                     Patients insurance company: [employer] only)

## 2021-06-23 PROBLEM — M25.562 ACUTE PAIN OF BOTH KNEES: Status: ACTIVE | Noted: 2021-06-23

## 2021-06-23 PROBLEM — M25.561 ACUTE PAIN OF BOTH KNEES: Status: ACTIVE | Noted: 2021-06-23

## 2021-06-28 ENCOUNTER — TELEPHONE (OUTPATIENT)
Dept: INTERNAL MEDICINE CLINIC | Facility: CLINIC | Age: 56
End: 2021-06-28

## 2021-06-28 ENCOUNTER — OFFICE VISIT (OUTPATIENT)
Dept: URGENT CARE | Facility: CLINIC | Age: 56
End: 2021-06-28
Payer: COMMERCIAL

## 2021-06-28 VITALS
SYSTOLIC BLOOD PRESSURE: 143 MMHG | RESPIRATION RATE: 18 BRPM | HEIGHT: 73 IN | TEMPERATURE: 97.1 F | OXYGEN SATURATION: 100 % | BODY MASS INDEX: 31.73 KG/M2 | WEIGHT: 239.4 LBS | DIASTOLIC BLOOD PRESSURE: 91 MMHG | HEART RATE: 88 BPM
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
## 2021-06-28 PROCEDURE — 99213 OFFICE O/P EST LOW 20 MIN: CPT | Performed by: NURSE PRACTITIONER

## 2021-06-28 PROCEDURE — G0463 HOSPITAL OUTPT CLINIC VISIT: HCPCS | Performed by: NURSE PRACTITIONER

## 2021-06-28 RX ORDER — DOXYCYCLINE HYCLATE 100 MG/1
100 CAPSULE ORAL EVERY 12 HOURS SCHEDULED
Qty: 28 CAPSULE | Refills: 0 | Status: SHIPPED | OUTPATIENT
Start: 2021-06-28 | End: 2021-07-12

## 2021-06-28 RX ORDER — DOXYCYCLINE 100 MG/1
200 TABLET ORAL ONCE
Qty: 2 TABLET | Refills: 0 | Status: SHIPPED | OUTPATIENT
Start: 2021-06-28 | End: 2021-06-28

## 2021-06-28 NOTE — PROGRESS NOTES
[address] [facility]        NAME: Jolene Lou is a 61 y o  male  : 1965    MRN: [id number]  DATE: 2021  TIME: 6:20 PM    Assessment and Plan   Tick bite, initial encounter [W57  XXXA]  1  Tick bite, initial encounter  doxycycline (ADOXA) 100 MG tablet         Patient Instructions     Patient Instructions   One time prophylactic doxy 1 time prophylactic dose of doxy  Follow up with PCP  Monitor site for any redness, swelling or drainage  If you develop any fevers, body aches, nausea, joint pain, muscle aches or any other symptoms associated with Lyme follow up with PC P  Go to the ER with any worsening symptoms  Chief Complaint     Chief Complaint   Patient presents with    Tick Removal     tick bite happened today or yesterday was cutting grass had lyme in          History of Present Illness   Jolene Lou presents to the clinic c/o    This is a 79-year-old male here today with complaints of a tick bite  He removed this morning  He thinks that may have attached yesterday or the day before  No fevers or shakes or chills  He does have some mild discomfort over site  He does know he did have Lyme disease in   No joint or muscle pain  No nausea or vomiting  Tdap is up-to-date as per patient  Review of Systems   Review of Systems   Constitutional: Negative for activity change, chills, fatigue and fever  Respiratory: Negative  Cardiovascular: Negative  Skin: Positive for wound  Neurological: Negative            Current Medications     Long-Term Medications   Medication Sig Dispense Refill    ibuprofen (MOTRIN) 800 mg tablet Take 600 mg by mouth every 6 (six) hours as needed for mild pain         Current Allergies     Allergies as of 2021    (No Known Allergies)            The following portions of the patient's history were reviewed and updated as appropriate: allergies, current medications, past family history, past medical history, past social history, past surgical history and problem list     Objective   /91   Pulse 88   Temp (!) 97 1 °F (36 2 °C)   Resp 18   Ht 6' 1" (1 854 m)   Wt 109 kg (239 lb 6 4 oz)   SpO2 100%   BMI 31 59 kg/m²        Physical Exam     Physical Exam  Vitals and nursing note reviewed  Constitutional:       Appearance: Normal appearance  Cardiovascular:      Rate and Rhythm: Normal rate and regular rhythm  Pulses: Normal pulses  Heart sounds: Normal heart sounds  Pulmonary:      Effort: Pulmonary effort is normal       Breath sounds: Normal breath sounds  Skin:     Comments: Left thigh small area or redness a  5 cm in circumference  Neurological:      Mental Status: He is alert and oriented to person, place, and time  Psychiatric:         Mood and Affect: Mood normal          Behavior: Behavior normal          Thought Content:  Thought content normal          Judgment: Judgment normal

## 2021-06-28 NOTE — PATIENT INSTRUCTIONS
One time prophylactic doxy 1 time prophylactic dose of doxy  Follow up with PCP  Monitor site for any redness, swelling or drainage  If you develop any fevers, body aches, nausea, joint pain, muscle aches or any other symptoms associated with Lyme follow up with PC P  Go to the ER with any worsening symptoms

## 2021-12-06 ENCOUNTER — VBI (OUTPATIENT)
Dept: ADMINISTRATIVE | Facility: OTHER | Age: 56
End: 2021-12-06

## 2022-02-03 ENCOUNTER — VBI (OUTPATIENT)
Dept: ADMINISTRATIVE | Facility: OTHER | Age: 57
End: 2022-02-03

## 2022-04-27 ENCOUNTER — TELEPHONE (OUTPATIENT)
Dept: INTERNAL MEDICINE CLINIC | Facility: CLINIC | Age: 57
End: 2022-04-27

## 2022-04-28 ENCOUNTER — RA CDI HCC (OUTPATIENT)
Dept: OTHER | Facility: HOSPITAL | Age: 57
End: 2022-04-28

## 2022-04-28 NOTE — PROGRESS NOTES
[facility] 75  coding opportunities       Chart reviewed, no opportunity found:   [address]        Patients Insurance     Medicare Insurance: [facility]

## 2022-05-04 PROBLEM — Z13.1 SCREENING FOR DIABETES MELLITUS: Status: ACTIVE | Noted: 2022-05-04

## 2022-05-04 PROBLEM — Z13.220 SCREENING FOR HYPERLIPIDEMIA: Status: ACTIVE | Noted: 2022-05-04

## 2022-05-04 PROBLEM — Z12.11 COLON CANCER SCREENING: Status: ACTIVE | Noted: 2022-05-04

## 2022-05-04 NOTE — ASSESSMENT & PLAN NOTE
 Lifestyle modification, diet and exercise discussed   Medications discussed and refilled appropriately   Labs discussed and ordered    Hepatitis C screening discussed   HIV screening discussed   Depression screening performed   Anxiety screening performed   BMI discussed   Colorectal cancer screening discussed and ordered    Fall screening performed

## 2022-05-05 ENCOUNTER — OFFICE VISIT (OUTPATIENT)
Dept: INTERNAL MEDICINE CLINIC | Facility: CLINIC | Age: 57
End: 2022-05-05
Payer: COMMERCIAL

## 2022-05-05 VITALS
TEMPERATURE: 97.2 F | DIASTOLIC BLOOD PRESSURE: 80 MMHG | SYSTOLIC BLOOD PRESSURE: 122 MMHG | BODY MASS INDEX: 33.03 KG/M2 | OXYGEN SATURATION: 96 % | HEIGHT: 73 IN | WEIGHT: 249.2 LBS | HEART RATE: 66 BPM

## 2022-05-05 DIAGNOSIS — G89.29 CHRONIC BILATERAL BACK PAIN, UNSPECIFIED BACK LOCATION: ICD-10-CM

## 2022-05-05 DIAGNOSIS — Z13.1 SCREENING FOR DIABETES MELLITUS: ICD-10-CM

## 2022-05-05 DIAGNOSIS — M54.9 CHRONIC BILATERAL BACK PAIN, UNSPECIFIED BACK LOCATION: ICD-10-CM

## 2022-05-05 DIAGNOSIS — Z00.00 MEDICARE ANNUAL WELLNESS VISIT, SUBSEQUENT: Primary | ICD-10-CM

## 2022-05-05 DIAGNOSIS — Z13.220 SCREENING FOR HYPERLIPIDEMIA: ICD-10-CM

## 2022-05-05 DIAGNOSIS — Z12.11 COLON CANCER SCREENING: ICD-10-CM

## 2022-05-05 PROCEDURE — 3725F SCREEN DEPRESSION PERFORMED: CPT | Performed by: NURSE PRACTITIONER

## 2022-05-05 PROCEDURE — G0439 PPPS, SUBSEQ VISIT: HCPCS | Performed by: NURSE PRACTITIONER

## 2022-05-05 PROCEDURE — 99214 OFFICE O/P EST MOD 30 MIN: CPT | Performed by: NURSE PRACTITIONER

## 2022-05-05 PROCEDURE — 1036F TOBACCO NON-USER: CPT | Performed by: NURSE PRACTITIONER

## 2022-05-05 PROCEDURE — 3008F BODY MASS INDEX DOCD: CPT | Performed by: NURSE PRACTITIONER

## 2022-05-05 NOTE — PROGRESS NOTES
Assessment/Plan:     Problem List Items Addressed This Visit        Other    Medicare annual wellness visit, subsequent - Primary      Lifestyle modification, diet and exercise discussed   Medications discussed and refilled appropriately   Labs discussed and ordered    Hepatitis C screening discussed   HIV screening discussed   Depression screening performed   Anxiety screening performed   BMI discussed   Colorectal cancer screening discussed and ordered    Fall screening performed         Relevant Orders    CBC and differential    Comprehensive metabolic panel    Chronic bilateral back pain    Screening for diabetes mellitus    Relevant Orders    HEMOGLOBIN A1C W/ EAG ESTIMATION    Screening for hyperlipidemia    Relevant Orders    Lipid panel    Colon cancer screening          Subjective:      Patient ID: Armaan Liu is a 61 y o  male  The patient is here for his Medicare Annual Wellness Visit  The following portions of the patient's history were reviewed and updated as appropriate:     Past Medical History:  He has a past medical history of Arthritis, BPH without urinary obstruction, Erectile dysfunction, Urethral stricture, and Weak urinary stream ,  _______________________________________________________________________  Medical Problems:  does not have any pertinent problems on file ,  _______________________________________________________________________  Past Surgical History:   has a past surgical history that includes Back surgery; Cystoscopy; Urethral dilation; Foot surgery; and Knee surgery  ,  _______________________________________________________________________  Family History:  family history includes Diabetes in his mother; Heart disease in his father; Stroke in his father ,  _______________________________________________________________________  Social History:   reports that he quit smoking about 24 years ago   He has never used smokeless tobacco  He reports current alcohol use  He reports that he does not use drugs  ,  _______________________________________________________________________  Allergies:  has No Known Allergies     _______________________________________________________________________  Current Outpatient Medications   Medication Sig Dispense Refill    ibuprofen (MOTRIN) 800 mg tablet Take 600 mg by mouth every 6 (six) hours as needed for mild pain       No current facility-administered medications for this visit      _______________________________________________________________________      Review of Systems   Constitutional: Negative for activity change, chills, fatigue and fever  HENT: Negative for rhinorrhea and sore throat  Eyes: Negative for pain  Respiratory: Negative for cough and shortness of breath  Cardiovascular: Negative for chest pain, palpitations and leg swelling  Gastrointestinal: Negative for abdominal pain, constipation, diarrhea, nausea and vomiting  Genitourinary: Negative for difficulty urinating, flank pain, frequency and urgency  Musculoskeletal: Positive for arthralgias, back pain and myalgias  Negative for gait problem and joint swelling  Skin: Negative for color change  Neurological: Negative for dizziness, weakness, light-headedness and headaches  Psychiatric/Behavioral: Negative for sleep disturbance  The patient is not nervous/anxious  All other systems reviewed and are negative  Objective:  Vitals:    05/05/22 0859   BP: 122/80   BP Location: Left arm   Patient Position: Sitting   Cuff Size: Standard   Pulse: 66   Temp: (!) 97 2 °F (36 2 °C)   SpO2: 96%   Weight: 113 kg (249 lb 3 2 oz)   Height: 6' 1" (1 854 m)     Body mass index is 32 88 kg/m²  Physical Exam  Vitals reviewed  Constitutional:       General: He is awake  Appearance: Normal appearance  He is well-developed and normal weight  HENT:      Head: Normocephalic and atraumatic        Nose: Nose normal       Mouth/Throat:      Mouth: Mucous membranes are moist    Eyes:      Extraocular Movements: Extraocular movements intact  Cardiovascular:      Rate and Rhythm: Normal rate and regular rhythm  Pulses: Normal pulses  Heart sounds: Normal heart sounds  Pulmonary:      Effort: Pulmonary effort is normal       Breath sounds: Normal breath sounds  Abdominal:      General: Bowel sounds are normal       Palpations: Abdomen is soft  Musculoskeletal:         General: Normal range of motion  Cervical back: Normal range of motion  Right lower leg: No edema  Left lower leg: No edema  Skin:     General: Skin is warm and dry  Neurological:      Mental Status: He is alert and oriented to person, place, and time  Psychiatric:         Attention and Perception: Attention normal          Mood and Affect: Mood normal          Speech: Speech normal          Behavior: Behavior normal  Behavior is cooperative  Assessment and Plan:     Problem List Items Addressed This Visit        Other    Medicare annual wellness visit, subsequent - Primary      Lifestyle modification, diet and exercise discussed   Medications discussed and refilled appropriately   Labs discussed and ordered    Hepatitis C screening discussed   HIV screening discussed   Depression screening performed   Anxiety screening performed   BMI discussed   Colorectal cancer screening discussed and ordered    Fall screening performed         Relevant Orders    CBC and differential    Comprehensive metabolic panel    Chronic bilateral back pain    Screening for diabetes mellitus    Relevant Orders    HEMOGLOBIN A1C W/ EAG ESTIMATION    Screening for hyperlipidemia    Relevant Orders    Lipid panel    Colon cancer screening        BMI Counseling: Body mass index is 32 88 kg/m²   The BMI is above normal  Nutrition recommendations include decreasing portion sizes, encouraging healthy choices of fruits and vegetables, decreasing fast food intake, consuming healthier snacks, limiting drinks that contain sugar, moderation in carbohydrate intake, increasing intake of lean protein, reducing intake of saturated and trans fat and reducing intake of cholesterol  Exercise recommendations include exercising 3-5 times per week  No pharmacotherapy was ordered  Rationale for BMI follow-up plan is due to patient being overweight or obese  Depression Screening and Follow-up Plan: Patient was screened for depression during today's encounter  They screened negative with a PHQ-2 score of 0  Preventive health issues were discussed with patient, and age appropriate screening tests were ordered as noted in patient's After Visit Summary  Personalized health advice and appropriate referrals for health education or preventive services given if needed, as noted in patient's After Visit Summary       History of Present Illness:     Patient presents for Medicare Annual Wellness visit    Patient Care Team:  Lynne De La O as PCP - General (Internal Medicine)     Problem List:     Patient Active Problem List   Diagnosis    Medicare annual wellness visit, subsequent    Chronic bilateral back pain    Acute pain of both knees    Screening for diabetes mellitus    Screening for hyperlipidemia    Colon cancer screening      Past Medical and Surgical History:     Past Medical History:   Diagnosis Date    Arthritis     last assessed 11/30/17     BPH without urinary obstruction     last assessed 11/30/17    Erectile dysfunction     last assessed 11/30/17     Urethral stricture     last assessed 11/30/17     Weak urinary stream     last assessed 11/30/17      Past Surgical History:   Procedure Laterality Date    BACK SURGERY      last assessed 11/30/17     CYSTOSCOPY      Bladder, last assessed 11/30/17     FOOT SURGERY      KNEE SURGERY      last assessed 11/30/17     URETHRAL DILATION      last assessed 11/30/17       Family History:     Family History   Problem Relation Age of Onset    Diabetes Mother     Heart disease Father         cardiac disorder     Stroke Father         CVA      Social History:     Social History     Socioeconomic History    Marital status:      Spouse name: None    Number of children: None    Years of education: None    Highest education level: None   Occupational History    Occupation: retired   Tobacco Use    Smoking status: Former Smoker     Quit date:      Years since quittin 3    Smokeless tobacco: Never Used    Tobacco comment: Never a smoker   Vaping Use    Vaping Use: Never used   Substance and Sexual Activity    Alcohol use: Yes     Comment: Social     Drug use: Never    Sexual activity: None   Other Topics Concern    None   Social History Narrative     per [employer]    No caffeine use      Social Determinants of Health     Financial Resource Strain: Not on file   Food Insecurity: Not on file   Transportation Needs: Not on file   Physical Activity: Not on file   Stress: Not on file   Social Connections: Not on file   Intimate Partner Violence: Not on file   Housing Stability: Not on file      Medications and Allergies:     Current Outpatient Medications   Medication Sig Dispense Refill    ibuprofen (MOTRIN) 800 mg tablet Take 600 mg by mouth every 6 (six) hours as needed for mild pain       No current facility-administered medications for this visit       No Known Allergies   Immunizations:     Immunization History   Administered Date(s) Administered    COVID-19 PFIZER VACCINE 0 3 ML IM 2021, 04/15/2021    H1N1, All Formulations 2010    Hep A, adult 2015    Hep B, adult 2016    INFLUENZA 2014, 2015, 10/27/2016, 10/20/2017    Influenza, recombinant, quadrivalent,injectable, preservative free 10/06/2020      Health Maintenance:         Topic Date Due    HIV Screening  Never done    Colorectal Cancer Screening  Never done    Hepatitis C Screening  Completed Topic Date Due    DTaP,Tdap,and Td Vaccines (1 - Tdap) Never done    COVID-19 Vaccine (3 - Booster for [employer] series) 09/15/2021      Medicare Health Risk Assessment:     /80 (BP Location: Left arm, Patient Position: Sitting, Cuff Size: Standard)   Pulse 66   Temp (!) 97 2 °F (36 2 °C)   Ht 6' 1" (1 854 m)   Wt 113 kg (249 lb 3 2 oz)   SpO2 96%   BMI 32 88 kg/m²      Rob Hampton is here for his Subsequent Wellness visit  Health Risk Assessment:   Patient rates overall health as good  Patient feels that their physical health rating is same  Patient is satisfied with their life  Eyesight was rated as same  Hearing was rated as same  Patient feels that their emotional and mental health rating is same  Patients states they are sometimes angry  Pain experienced in the last 7 days has been some  Patient's pain rating has been 5/10  Patient states that he has experienced no weight loss or gain in last 6 months  Depression Screening:   PHQ-2 Score: 0      Fall Risk Screening: In the past year, patient has experienced: no history of falling in past year      Home Safety:  Patient has trouble with stairs inside or outside of their home  Patient has working smoke alarms and has working carbon monoxide detector  Nutrition:   Current diet is Regular and Limited junk food  Medications:   Patient is currently taking over-the-counter supplements  OTC medications include: see medication list  Patient is able to manage medications  Activities of Daily Living (ADLs)/Instrumental Activities of Daily Living (IADLs):   Walk and transfer into and out of bed and chair?: Yes  Dress and groom yourself?: Yes    Bathe or shower yourself?: Yes    Feed yourself?  Yes  Do your laundry/housekeeping?: Yes  Manage your money, pay your bills and track your expenses?: Yes  Make your own meals?: Yes    Do your own shopping?: Yes    Previous Hospitalizations:   Any hospitalizations or ED visits within the last 12 months?: Yes      Advance Care Planning:   Living will: No    Durable POA for healthcare: Yes    Advanced directive: No    Five wishes given: Yes      Cognitive Screening:   Provider or family/friend/caregiver concerned regarding cognition?: Yes    PREVENTIVE SCREENINGS      Cardiovascular Screening:    General: Screening Current      Abdominal Aortic Aneurysm (AAA) Screening:    Risk factors include: tobacco use        Lung Cancer Screening:     General: Screening Not Indicated      Hepatitis C Screening:    General: Screening Current    Screening, Brief Intervention, and Referral to Treatment (SBIRT)    Screening      Single Item Drug Screening:  How often have you used an illegal drug (including marijuana) or a prescription medication for non-medical reasons in the past year? never    Single Item Drug Screen Score: 0  Interpretation: Negative screen for possible drug use disorder    Other Counseling Topics:   Car/seat belt/driving safety and regular weightbearing exercise         [address]

## 2022-05-05 NOTE — PATIENT INSTRUCTIONS
Medicare Preventive Visit Patient Instructions  Thank you for completing your Welcome to Medicare Visit or Medicare Annual Wellness Visit today  Your next wellness visit will be due in one year (5/6/2023)  The screening/preventive services that you may require over the next 5-10 years are detailed below  Some tests may not apply to you based off risk factors and/or age  Screening tests ordered at today's visit but not completed yet may show as past due  Also, please note that scanned in results may not display below  Preventive Screenings:  Service Recommendations Previous Testing/Comments   Colorectal Cancer Screening  · Colonoscopy    · Fecal Occult Blood Test (FOBT)/Fecal Immunochemical Test (FIT)  · Fecal DNA/Cologuard Test  · Flexible Sigmoidoscopy Age: 54-65 years old   Colonoscopy: every 10 years (May be performed more frequently if at higher risk)  OR  FOBT/FIT: every 1 year  OR  Cologuard: every 3 years  OR  Sigmoidoscopy: every 5 years  Screening may be recommended earlier than age 48 if at higher risk for colorectal cancer  Also, an individualized decision between you and your healthcare provider will decide whether screening between the ages of 74-80 would be appropriate   Colonoscopy: Not on file  FOBT/FIT: Not on file  Cologuard: Not on file  Sigmoidoscopy: Not on file          Prostate Cancer Screening Individualized decision between patient and health care provider in men between ages of 53-78   Medicare will cover every 12 months beginning on the day after your 50th birthday PSA: No results in last 5 years           Hepatitis C Screening Once for adults born between 1945 and 1965  More frequently in patients at high risk for Hepatitis C Hep C Antibody: 11/03/2020    Screening Current   Diabetes Screening 1-2 times per year if you're at risk for diabetes or have pre-diabetes Fasting glucose: 89 mg/dL   A1C: No results in last 5 years        Cholesterol Screening Once every 5 years if you don't have a lipid disorder  May order more often based on risk factors  Lipid panel: 04/20/2021    Screening Current      Other Preventive Screenings Covered by Medicare:  1  Abdominal Aortic Aneurysm (AAA) Screening: covered once if your at risk  You're considered to be at risk if you have a family history of AAA or a male between the age of 73-68 who smoking at least 100 cigarettes in your lifetime  2  Lung Cancer Screening: covers low dose CT scan once per year if you meet all of the following conditions: (1) Age 50-69; (2) No signs or symptoms of lung cancer; (3) Current smoker or have quit smoking within the last 15 years; (4) You have a tobacco smoking history of at least 30 pack years (packs per day x number of years you smoked); (5) You get a written order from a healthcare provider  3  Glaucoma Screening: covered annually if you're considered high risk: (1) You have diabetes OR (2) Family history of glaucoma OR (3)  aged 48 and older OR (3)  American aged 72 and older  3  Osteoporosis Screening: covered every 2 years if you meet one of the following conditions: (1) Have a vertebral abnormality; (2) On glucocorticoid therapy for more than 3 months; (3) Have primary hyperparathyroidism; (4) On osteoporosis medications and need to assess response to drug therapy  5  HIV Screening: covered annually if you're between the age of 12-76  Also covered annually if you are younger than 13 and older than 72 with risk factors for HIV infection  For pregnant patients, it is covered up to 3 times per pregnancy      Immunizations:  Immunization Recommendations   Influenza Vaccine Annual influenza vaccination during flu season is recommended for all persons aged >= 6 months who do not have contraindications   Pneumococcal Vaccine (Prevnar and Pneumovax)  * Prevnar = PCV13  * Pneumovax = PPSV23 Adults 25-60 years old: 1-3 doses may be recommended based on certain risk factors  Adults 72 years old: Prevnar (PCV13) vaccine recommended followed by Pneumovax (PPSV23) vaccine  If already received PPSV23 since turning 65, then PCV13 recommended at least one year after PPSV23 dose  Hepatitis B Vaccine 3 dose series if at intermediate or high risk (ex: diabetes, end stage renal disease, liver disease)   Tetanus (Td) Vaccine - COST NOT COVERED BY MEDICARE PART B Following completion of primary series, a booster dose should be given every 10 years to maintain immunity against tetanus  Td may also be given as tetanus wound prophylaxis  Tdap Vaccine - COST NOT COVERED BY MEDICARE PART B Recommended at least once for all adults  For pregnant patients, recommended with each pregnancy  Shingles Vaccine (Shingrix) - COST NOT COVERED BY MEDICARE PART B  2 shot series recommended in those aged 48 and above     Health Maintenance Due:      Topic Date Due    HIV Screening  Never done    Colorectal Cancer Screening  Never done    Hepatitis C Screening  Completed     Immunizations Due:      Topic Date Due    DTaP,Tdap,and Td Vaccines (1 - Tdap) Never done    COVID-19 Vaccine (3 - Booster for Pfizer series) 09/15/2021     Advance Directives   What are advance directives? Advance directives are legal documents that state your wishes and plans for medical care  These plans are made ahead of time in case you lose your ability to make decisions for yourself  Advance directives can apply to any medical decision, such as the treatments you want, and if you want to donate organs  What are the types of advance directives? There are many types of advance directives, and each state has rules about how to use them  You may choose a combination of any of the following:  · Living will: This is a written record of the treatment you want  You can also choose which treatments you do not want, which to limit, and which to stop at a certain time  This includes surgery, medicine, IV fluid, and tube feedings     · Durable power of  for [facility]): This is a written record that states who you want to make healthcare choices for you when you are unable to make them for yourself  This person, called a proxy, is usually a family member or a friend  You may choose more than 1 proxy  · Do not resuscitate (DNR) order:  A DNR order is used in case your heart stops beating or you stop breathing  It is a request not to have certain forms of treatment, such as CPR  A DNR order may be included in other types of advance directives  · Medical directive: This covers the care that you want if you are in a coma, near death, or unable to make decisions for yourself  You can list the treatments you want for each condition  Treatment may include pain medicine, surgery, blood transfusions, dialysis, IV or tube feedings, and a ventilator (breathing machine)  · Values history: This document has questions about your views, beliefs, and how you feel and think about life  This information can help others choose the care that you would choose  Why are advance directives important? An advance directive helps you control your care  Although spoken wishes may be used, it is better to have your wishes written down  Spoken wishes can be misunderstood, or not followed  Treatments may be given even if you do not want them  An advance directive may make it easier for your family to make difficult choices about your care  Weight Management   Why it is important to manage your weight:  Being overweight increases your risk of health conditions such as heart disease, high blood pressure, type 2 diabetes, and certain types of cancer  It can also increase your risk for osteoarthritis, sleep apnea, and other respiratory problems  Aim for a slow, steady weight loss  Even a small amount of weight loss can lower your risk of health problems  How to lose weight safely:  A safe and healthy way to lose weight is to eat fewer calories and get regular exercise   You can lose up about 1 pound a week by decreasing the number of calories you eat by 500 calories each day  Healthy meal plan for weight management:  A healthy meal plan includes a variety of foods, contains fewer calories, and helps you stay healthy  A healthy meal plan includes the following:  · Eat whole-grain foods more often  A healthy meal plan should contain fiber  Fiber is the part of grains, fruits, and vegetables that is not broken down by your body  Whole-grain foods are healthy and provide extra fiber in your diet  Some examples of whole-grain foods are whole-wheat breads and pastas, oatmeal, brown rice, and bulgur  · Eat a variety of vegetables every day  Include dark, leafy greens such as spinach, kale, va greens, and mustard greens  Eat yellow and orange vegetables such as carrots, sweet potatoes, and winter squash  · Eat a variety of fruits every day  Choose fresh or canned fruit (canned in its own juice or light syrup) instead of juice  Fruit juice has very little or no fiber  · Eat low-fat dairy foods  Drink fat-free (skim) milk or 1% milk  Eat fat-free yogurt and low-fat cottage cheese  Try low-fat cheeses such as mozzarella and other reduced-fat cheeses  · Choose meat and other protein foods that are low in fat  Choose beans or other legumes such as split peas or lentils  Choose fish, skinless poultry (chicken or turkey), or lean cuts of red meat (beef or pork)  Before you cook meat or poultry, cut off any visible fat  · Use less fat and oil  Try baking foods instead of frying them  Add less fat, such as margarine, sour cream, regular salad dressing and mayonnaise to foods  Eat fewer high-fat foods  Some examples of high-fat foods include french fries, doughnuts, ice cream, and cakes  · Eat fewer sweets  Limit foods and drinks that are high in sugar  This includes candy, cookies, regular soda, and sweetened drinks    Exercise:  Exercise at least 30 minutes per day on most days of the week  Some examples of exercise include walking, biking, dancing, and swimming  You can also fit in more physical activity by taking the stairs instead of the elevator or parking farther away from stores  Ask your healthcare provider about the best exercise plan for you  © Copyright [employer] 2018 Information is for End User's use only and may not be sold, redistributed or otherwise used for commercial purposes  All illustrations and images included in CareNotes® are the copyrighted property of A D A M , Inc  or Blake Rosario     _______________________________________________________________________    WE would like to take a minute to say thank you for choosing [facility] as your PCP  As a team Y[facility] Obando and Alex Ruffin are always trying to make our patients feel more comfortable and important    Because you are important to us! After your appointment is complete you'll receive a brief survey either by text or on your MyChart  If you could take a couple moments and let us know how we are doing, we would really appreciate it  If you're happy with your appointment let us know that too! Thank you! Please remember we are always here for you! We look forward to hearing from you! Stay healthy!                                             Leonid Ruffin  ____________________________________________________________________    Problem List Items Addressed This Visit        Other    Medicare annual wellness visit, subsequent - Primary      Lifestyle modification, diet and exercise discussed   Medications discussed and refilled appropriately   Labs discussed and ordered    Hepatitis C screening discussed   HIV screening discussed   Depression screening performed   Anxiety screening performed   BMI discussed   Colorectal cancer screening discussed and ordered    Fall screening performed         Relevant Orders CBC and differential    Comprehensive metabolic panel    Chronic bilateral back pain    Screening for diabetes mellitus    Relevant Orders    HEMOGLOBIN A1C W/ EAG ESTIMATION    Screening for hyperlipidemia    Relevant Orders    Lipid panel    Colon cancer screening

## 2022-05-06 ENCOUNTER — APPOINTMENT (OUTPATIENT)
Dept: LAB | Facility: CLINIC | Age: 57
End: 2022-05-06
Payer: COMMERCIAL

## 2022-05-06 DIAGNOSIS — Z13.220 SCREENING FOR HYPERLIPIDEMIA: ICD-10-CM

## 2022-05-06 DIAGNOSIS — Z13.1 SCREENING FOR DIABETES MELLITUS: ICD-10-CM

## 2022-05-06 DIAGNOSIS — Z00.00 MEDICARE ANNUAL WELLNESS VISIT, SUBSEQUENT: ICD-10-CM

## 2022-05-06 LAB
ALBUMIN SERPL BCP-MCNC: 4.2 G/DL (ref 3.5–5)
ALP SERPL-CCNC: 32 U/L (ref 46–116)
ALT SERPL W P-5'-P-CCNC: 56 U/L (ref 12–78)
ANION GAP SERPL CALCULATED.3IONS-SCNC: 2 MMOL/L (ref 4–13)
AST SERPL W P-5'-P-CCNC: 27 U/L (ref 5–45)
BASOPHILS # BLD AUTO: 0.04 THOUSANDS/ΜL (ref 0–0.1)
BASOPHILS NFR BLD AUTO: 1 % (ref 0–1)
BILIRUB SERPL-MCNC: 0.45 MG/DL (ref 0.2–1)
BUN SERPL-MCNC: 20 MG/DL (ref 5–25)
CALCIUM SERPL-MCNC: 9.5 MG/DL (ref 8.3–10.1)
CHLORIDE SERPL-SCNC: 105 MMOL/L (ref 100–108)
CHOLEST SERPL-MCNC: 163 MG/DL
CO2 SERPL-SCNC: 31 MMOL/L (ref 21–32)
CREAT SERPL-MCNC: 0.81 MG/DL (ref 0.6–1.3)
EOSINOPHIL # BLD AUTO: 0.12 THOUSAND/ΜL (ref 0–0.61)
EOSINOPHIL NFR BLD AUTO: 2 % (ref 0–6)
ERYTHROCYTE [DISTWIDTH] IN BLOOD BY AUTOMATED COUNT: 12.9 % (ref 11.6–15.1)
EST. AVERAGE GLUCOSE BLD GHB EST-MCNC: 108 MG/DL
GFR SERPL CREATININE-BSD FRML MDRD: 97 ML/MIN/1.73SQ M
GLUCOSE P FAST SERPL-MCNC: 92 MG/DL (ref 65–99)
HBA1C MFR BLD: 5.4 %
HCT VFR BLD AUTO: 45.6 % (ref 36.5–49.3)
HDLC SERPL-MCNC: 41 MG/DL
HGB BLD-MCNC: 15.3 G/DL (ref 12–17)
IMM GRANULOCYTES # BLD AUTO: 0.04 THOUSAND/UL (ref 0–0.2)
IMM GRANULOCYTES NFR BLD AUTO: 1 % (ref 0–2)
LDLC SERPL CALC-MCNC: 97 MG/DL (ref 0–100)
LYMPHOCYTES # BLD AUTO: 3.23 THOUSANDS/ΜL (ref 0.6–4.47)
LYMPHOCYTES NFR BLD AUTO: 48 % (ref 14–44)
MCH RBC QN AUTO: 30.1 PG (ref 26.8–34.3)
MCHC RBC AUTO-ENTMCNC: 33.6 G/DL (ref 31.4–37.4)
MCV RBC AUTO: 90 FL (ref 82–98)
MONOCYTES # BLD AUTO: 0.57 THOUSAND/ΜL (ref 0.17–1.22)
MONOCYTES NFR BLD AUTO: 9 % (ref 4–12)
NEUTROPHILS # BLD AUTO: 2.59 THOUSANDS/ΜL (ref 1.85–7.62)
NEUTS SEG NFR BLD AUTO: 39 % (ref 43–75)
NONHDLC SERPL-MCNC: 122 MG/DL
NRBC BLD AUTO-RTO: 0 /100 WBCS
PLATELET # BLD AUTO: 172 THOUSANDS/UL (ref 149–390)
PMV BLD AUTO: 11.2 FL (ref 8.9–12.7)
POTASSIUM SERPL-SCNC: 4.2 MMOL/L (ref 3.5–5.3)
PROT SERPL-MCNC: 7.3 G/DL (ref 6.4–8.2)
RBC # BLD AUTO: 5.09 MILLION/UL (ref 3.88–5.62)
SODIUM SERPL-SCNC: 138 MMOL/L (ref 136–145)
TRIGL SERPL-MCNC: 124 MG/DL
WBC # BLD AUTO: 6.59 THOUSAND/UL (ref 4.31–10.16)

## 2022-05-06 PROCEDURE — 80061 LIPID PANEL: CPT

## 2022-05-06 PROCEDURE — 80053 COMPREHEN METABOLIC PANEL: CPT

## 2022-05-06 PROCEDURE — 36415 COLL VENOUS BLD VENIPUNCTURE: CPT

## 2022-05-06 PROCEDURE — 85025 COMPLETE CBC W/AUTO DIFF WBC: CPT

## 2022-05-06 PROCEDURE — 83036 HEMOGLOBIN GLYCOSYLATED A1C: CPT

## 2022-10-11 PROBLEM — Z13.1 SCREENING FOR DIABETES MELLITUS: Status: RESOLVED | Noted: 2022-05-04 | Resolved: 2022-10-11

## 2022-10-11 PROBLEM — Z13.220 SCREENING FOR HYPERLIPIDEMIA: Status: RESOLVED | Noted: 2022-05-04 | Resolved: 2022-10-11

## 2022-10-11 PROBLEM — Z12.11 COLON CANCER SCREENING: Status: RESOLVED | Noted: 2022-05-04 | Resolved: 2022-10-11

## 2022-10-12 PROBLEM — Z00.00 MEDICARE ANNUAL WELLNESS VISIT, SUBSEQUENT: Status: RESOLVED | Noted: 2021-03-23 | Resolved: 2022-10-12

## 2023-03-06 ENCOUNTER — OFFICE VISIT (OUTPATIENT)
Dept: INTERNAL MEDICINE CLINIC | Facility: CLINIC | Age: 58
End: 2023-03-06

## 2023-03-06 VITALS
TEMPERATURE: 97.8 F | WEIGHT: 239.8 LBS | HEART RATE: 74 BPM | SYSTOLIC BLOOD PRESSURE: 122 MMHG | HEIGHT: 73 IN | DIASTOLIC BLOOD PRESSURE: 80 MMHG | BODY MASS INDEX: 31.78 KG/M2 | OXYGEN SATURATION: 98 %

## 2023-03-06 DIAGNOSIS — E66.09 CLASS 1 OBESITY DUE TO EXCESS CALORIES WITHOUT SERIOUS COMORBIDITY WITH BODY MASS INDEX (BMI) OF 31.0 TO 31.9 IN ADULT: ICD-10-CM

## 2023-03-06 DIAGNOSIS — M25.562 ACUTE PAIN OF BOTH KNEES: ICD-10-CM

## 2023-03-06 DIAGNOSIS — Z12.11 COLON CANCER SCREENING: ICD-10-CM

## 2023-03-06 DIAGNOSIS — Z00.00 MEDICARE ANNUAL WELLNESS VISIT, SUBSEQUENT: ICD-10-CM

## 2023-03-06 DIAGNOSIS — Z13.21 ENCOUNTER FOR VITAMIN DEFICIENCY SCREENING: ICD-10-CM

## 2023-03-06 DIAGNOSIS — M25.561 ACUTE PAIN OF BOTH KNEES: ICD-10-CM

## 2023-03-06 DIAGNOSIS — Z12.11 ENCOUNTER FOR SCREENING FECAL OCCULT BLOOD TESTING: ICD-10-CM

## 2023-03-06 DIAGNOSIS — Z13.1 SCREENING FOR DIABETES MELLITUS: ICD-10-CM

## 2023-03-06 DIAGNOSIS — Z00.00 ENCOUNTER FOR PHYSICAL EXAMINATION: Primary | ICD-10-CM

## 2023-03-06 DIAGNOSIS — M54.9 CHRONIC BILATERAL BACK PAIN, UNSPECIFIED BACK LOCATION: ICD-10-CM

## 2023-03-06 DIAGNOSIS — G89.29 CHRONIC BILATERAL BACK PAIN, UNSPECIFIED BACK LOCATION: ICD-10-CM

## 2023-03-06 DIAGNOSIS — Z13.220 SCREENING FOR HYPERLIPIDEMIA: ICD-10-CM

## 2023-03-06 DIAGNOSIS — Z86.19 H/O COLD SORES: ICD-10-CM

## 2023-03-06 DIAGNOSIS — Z12.5 SCREENING FOR PROSTATE CANCER: ICD-10-CM

## 2023-03-06 PROBLEM — L03.90 CELLULITIS OF SKIN: Status: ACTIVE | Noted: 2017-11-26

## 2023-03-06 PROBLEM — H10.9 CONJUNCTIVITIS, BOTH EYES: Status: ACTIVE | Noted: 2017-11-26

## 2023-03-06 PROBLEM — R35.1 NOCTURIA: Status: ACTIVE | Noted: 2017-12-08

## 2023-03-06 PROBLEM — E66.811 CLASS 1 OBESITY DUE TO EXCESS CALORIES WITHOUT SERIOUS COMORBIDITY WITH BODY MASS INDEX (BMI) OF 31.0 TO 31.9 IN ADULT: Status: ACTIVE | Noted: 2023-03-06

## 2023-03-06 RX ORDER — PENCICLOVIR 10 MG/G
CREAM TOPICAL
Qty: 1.5 G | Refills: 0 | Status: SHIPPED | OUTPATIENT
Start: 2023-03-06

## 2023-03-06 RX ORDER — IBUPROFEN 600 MG/1
600 TABLET ORAL EVERY 6 HOURS PRN
Qty: 120 TABLET | Refills: 0 | Status: SHIPPED | OUTPATIENT
Start: 2023-03-06

## 2023-03-06 NOTE — PATIENT INSTRUCTIONS
Problem List Items Addressed This Visit          Other    Medicare annual wellness visit, subsequent    Relevant Orders    CBC and differential    Comprehensive metabolic panel    Chronic bilateral back pain    Relevant Medications    ibuprofen (MOTRIN) 600 mg tablet    Acute pain of both knees    Relevant Medications    ibuprofen (MOTRIN) 600 mg tablet    Screening for diabetes mellitus    Relevant Orders    HEMOGLOBIN A1C W/ EAG ESTIMATION    Screening for hyperlipidemia    Relevant Orders    Lipid panel    Colon cancer screening    Relevant Orders    Cologuard    Encounter for physical examination - Primary     3/6/2023  Patient is here for physical for his [employer]  We will order labs          H/O cold sores     Will order penciclovir cream          Relevant Medications    penciclovir (DENAVIR) 1 % cream    Encounter for vitamin deficiency screening    Relevant Orders    Vitamin D 25 hydroxy    Vitamin B12    Vitamin C    Vitamin E    Vitamin A and Carotene    Niacin (vitamin B3)    Vitamin B2    Vitamin B5 (Pantothenic Acid)    Vitamin B6    BIOTIN (VITAMIN B7)    Magnesium    Phosphorus    Zinc    Screening for prostate cancer    Relevant Orders    PSA, Total Screen    Class 1 obesity due to excess calories without serious comorbidity with body mass index (BMI) of 31 0 to 31 9 in adult     Other Visit Diagnoses       Encounter for screening fecal occult blood testing        Relevant Orders    Cologuard

## 2023-03-06 NOTE — PROGRESS NOTES
Assessment/Plan:     Problem List Items Addressed This Visit        Other    Medicare annual wellness visit, subsequent    Relevant Orders    CBC and differential    Comprehensive metabolic panel    Chronic bilateral back pain    Relevant Medications    ibuprofen (MOTRIN) 600 mg tablet    Acute pain of both knees    Relevant Medications    ibuprofen (MOTRIN) 600 mg tablet    Screening for diabetes mellitus    Relevant Orders    HEMOGLOBIN A1C W/ EAG ESTIMATION    Screening for hyperlipidemia    Relevant Orders    Lipid panel    Colon cancer screening    Relevant Orders    Cologuard    Encounter for physical examination - Primary     · 3/6/2023  · Patient is here for physical for his [address]  · We will order labs          H/O cold sores     · Will order penciclovir cream          Relevant Medications    penciclovir (DENAVIR) 1 % cream    Encounter for vitamin deficiency screening    Relevant Orders    Vitamin D 25 hydroxy    Vitamin B12    Vitamin C    Vitamin E    Vitamin A and Carotene    Niacin (vitamin B3)    Vitamin B2    Vitamin B5 (Pantothenic Acid)    Vitamin B6    BIOTIN (VITAMIN B7)    Magnesium    Phosphorus    Zinc    Screening for prostate cancer    Relevant Orders    PSA, Total Screen    Class 1 obesity due to excess calories without serious comorbidity with body mass index (BMI) of 31 0 to 31 9 in adult   Other Visit Diagnoses     Encounter for screening fecal occult blood testing        Relevant Orders    Cologuard          Subjective:      Patient ID: Maday Gann is a 61 y o  male  The patient is here today to discuss his medications and treatment plans  Please continue to the [facility] section of the note for details of today's visit          The following portions of the patient's history were reviewed and updated as appropriate:     Past Medical History:  He has a past medical history of Arthritis, BPH without urinary obstruction, Erectile dysfunction, Urethral stricture, and Weak urinary stream ,  _______________________________________________________________________  Medical Problems:  does not have any pertinent problems on file ,  _______________________________________________________________________  Past Surgical History:   has a past surgical history that includes Back surgery; Cystoscopy; Urethral dilation; Foot surgery; and Knee surgery  ,  _______________________________________________________________________  Family History:  family history includes Diabetes in his mother; Heart disease in his father; Stroke in his father ,  _______________________________________________________________________  Social History:   reports that he quit smoking about 25 years ago  He has never used smokeless tobacco  He reports current alcohol use  He reports that he does not use drugs  ,  _______________________________________________________________________  Allergies:  has No Known Allergies     _______________________________________________________________________  Current Outpatient Medications   Medication Sig Dispense Refill   • ibuprofen (MOTRIN) 600 mg tablet Take 1 tablet (600 mg total) by mouth every 6 (six) hours as needed for mild pain 120 tablet 0   • penciclovir (DENAVIR) 1 % cream Apply topically every 2 (two) hours 1 5 g 0     No current facility-administered medications for this visit      _______________________________________________________________________      Review of Systems   Constitutional: Negative for activity change, chills, fatigue and fever  HENT: Negative for rhinorrhea and sore throat  Eyes: Negative for pain  Respiratory: Negative for cough and shortness of breath  Cardiovascular: Negative for chest pain, palpitations and leg swelling  Gastrointestinal: Negative for abdominal pain, constipation, diarrhea, nausea and vomiting  Genitourinary: Negative for difficulty urinating, flank pain, frequency and urgency     Musculoskeletal: Negative for gait problem, joint swelling and myalgias  Skin: Negative for color change  Neurological: Negative for dizziness, weakness, light-headedness and headaches  Psychiatric/Behavioral: Negative for sleep disturbance  The patient is not nervous/anxious  All other systems reviewed and are negative  Objective:  Vitals:    03/06/23 0804   BP: 122/80   BP Location: Left arm   Patient Position: Sitting   Cuff Size: Standard   Pulse: 74   Temp: 97 8 °F (36 6 °C)   SpO2: 98%   Weight: 109 kg (239 lb 12 8 oz)   Height: 6' 1" (1 854 m)     Body mass index is 31 64 kg/m²  Physical Exam  Vitals reviewed  Constitutional:       General: He is awake  Appearance: Normal appearance  He is well-developed and normal weight  HENT:      Head: Normocephalic and atraumatic  Nose: Nose normal       Mouth/Throat:      Mouth: Mucous membranes are moist    Eyes:      Extraocular Movements: Extraocular movements intact  Cardiovascular:      Rate and Rhythm: Normal rate and regular rhythm  Pulses: Normal pulses  Heart sounds: Normal heart sounds  Pulmonary:      Effort: Pulmonary effort is normal       Breath sounds: Normal breath sounds  Abdominal:      General: Bowel sounds are normal       Palpations: Abdomen is soft  Musculoskeletal:         General: Normal range of motion  Cervical back: Normal range of motion  Right lower leg: No edema  Left lower leg: No edema  Skin:     General: Skin is warm and dry  Neurological:      Mental Status: He is alert and oriented to person, place, and time  Psychiatric:         Attention and Perception: Attention normal          Mood and Affect: Mood normal          Speech: Speech normal          Behavior: Behavior normal  Behavior is cooperative

## 2023-03-16 LAB — COLOGUARD RESULT REPORTABLE: POSITIVE

## 2023-04-06 DIAGNOSIS — R19.5 POSITIVE COLORECTAL CANCER SCREENING USING COLOGUARD TEST: Primary | ICD-10-CM

## 2023-04-08 ENCOUNTER — OFFICE VISIT (OUTPATIENT)
Dept: URGENT CARE | Facility: CLINIC | Age: 58
End: 2023-04-08

## 2023-04-08 VITALS
RESPIRATION RATE: 16 BRPM | WEIGHT: 241.2 LBS | HEART RATE: 75 BPM | TEMPERATURE: 98.2 F | HEIGHT: 73 IN | OXYGEN SATURATION: 97 % | BODY MASS INDEX: 31.97 KG/M2

## 2023-04-08 DIAGNOSIS — S30.860A TICK BITE OF BACK, INITIAL ENCOUNTER: ICD-10-CM
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
## 2023-04-08 RX ORDER — DOXYCYCLINE 100 MG/1
200 TABLET ORAL ONCE
Qty: 2 TABLET | Refills: 0 | Status: SHIPPED | COMMUNITY
Start: 2023-04-08 | End: 2023-04-08

## 2023-04-08 NOTE — PATIENT INSTRUCTIONS
Take one time dose of doxycycline  Monitor site for redness, rash, or drainage  If you develop any fever, chills, aches, joint pain or swelling, headache, dizziness, numbness or any new or concerning symptoms please return or proceed to ER  Advise f/u with pcp in 3-5 days

## 2023-04-08 NOTE — PROGRESS NOTES
[facility]        NAME: Merari Huizar is a 61 y o  male  : 1965    MRN: [id number]  DATE: 2023  TIME: 2:13 PM    Assessment and Plan   Tick bite with subsequent removal of tick [W57  [id number]]  1  Tick bite with subsequent removal of tick  doxycycline (ADOXA) 100 MG tablet      2  Tick bite of back, initial encounter          Tick removed from left hip with tweezers  Tolerated well  Bacitracin and bandaid applied  Patient Instructions     Patient Instructions   Take one time dose of doxycycline  Monitor site for redness, rash, or drainage  If you develop any fever, chills, aches, joint pain or swelling, headache, dizziness, numbness or any new or concerning symptoms please return or proceed to ER  Advise f/u with pcp in 3-5 days  Chief Complaint     Chief Complaint   Patient presents with   • Tick Removal     Has a tick on his right lower back noticed it this morning          History of Present Illness       Pt was outside doing yardwork on Thursday  This morning, he noticed a tick on his left lower back  The tick was engorged  He was able to remove part of the tick but unable to remove it in its entirety  Presents today for complete removal  Denies any pain to the area  No fevers, chills, body aches  No rash  Review of Systems   Review of Systems   Constitutional: Negative for chills, diaphoresis, fatigue and fever  Respiratory: Negative  Cardiovascular: Negative  Musculoskeletal: Negative for arthralgias, back pain, joint swelling and myalgias  Skin: Positive for wound (left lower back post tick bite)  Negative for rash  Neurological: Negative  Negative for dizziness, weakness, light-headedness, numbness and headaches           Current Medications       Current Outpatient Medications:   •  doxycycline (ADOXA) 100 MG tablet, Take 2 tablets (200 mg total) by mouth 1 (one) time for 1 dose, Disp: 2 tablet, Rfl: 0  •  ibuprofen (MOTRIN) 600 mg tablet, Take 1 tablet "(600 mg total) by mouth every 6 (six) hours as needed for mild pain, Disp: 120 tablet, Rfl: 0  •  penciclovir (DENAVIR) 1 % cream, Apply topically every 2 (two) hours, Disp: 1 5 g, Rfl: 0    Current Allergies     Allergies as of 04/08/2023   • (No Known Allergies)            The following portions of the patient's history were reviewed and updated as appropriate: allergies, current medications, past family history, past medical history, past social history, past surgical history and problem list      Past Medical History:   Diagnosis Date   • Arthritis     last assessed 11/30/17    • BPH without urinary obstruction     last assessed 11/30/17   • Erectile dysfunction     last assessed 11/30/17    • Urethral stricture     last assessed 11/30/17    • Weak urinary stream     last assessed 11/30/17        Past Surgical History:   Procedure Laterality Date   • BACK SURGERY      last assessed 11/30/17    • CYSTOSCOPY      Bladder, last assessed 11/30/17    • FOOT SURGERY     • KNEE SURGERY      last assessed 11/30/17    • URETHRAL DILATION      last assessed 11/30/17        Family History   Problem Relation Age of Onset   • Diabetes Mother    • Heart disease Father         cardiac disorder    • Stroke Father         CVA         Medications have been verified  Objective   Pulse 75   Temp 98 2 °F (36 8 °C)   Resp 16   Ht 6' 1\" (1 854 m)   Wt 109 kg (241 lb 3 2 oz)   SpO2 97%   BMI 31 82 kg/m²   No LMP for male patient  Physical Exam     Physical Exam  Constitutional:       General: He is not in acute distress  Appearance: Normal appearance  He is not diaphoretic  HENT:      Head: Normocephalic and atraumatic  Cardiovascular:      Rate and Rhythm: Normal rate and regular rhythm  Heart sounds: Normal heart sounds, S1 normal and S2 normal    Pulmonary:      Effort: Pulmonary effort is normal       Breath sounds: Normal breath sounds and air entry  Skin:     Findings: Erythema and wound present   " No rash  Neurological:      Mental Status: He is alert

## 2023-05-05 PROBLEM — Z13.1 SCREENING FOR DIABETES MELLITUS: Status: RESOLVED | Noted: 2022-05-04 | Resolved: 2023-05-05

## 2023-05-05 PROBLEM — Z12.5 SCREENING FOR PROSTATE CANCER: Status: RESOLVED | Noted: 2023-03-06 | Resolved: 2023-05-05

## 2023-05-05 PROBLEM — Z12.11 COLON CANCER SCREENING: Status: RESOLVED | Noted: 2022-05-04 | Resolved: 2023-05-05

## 2023-05-05 PROBLEM — H10.9 CONJUNCTIVITIS, BOTH EYES: Status: RESOLVED | Noted: 2017-11-26 | Resolved: 2023-05-05

## 2023-05-05 PROBLEM — Z00.00 MEDICARE ANNUAL WELLNESS VISIT, SUBSEQUENT: Status: RESOLVED | Noted: 2021-03-23 | Resolved: 2023-05-05

## 2023-05-05 PROBLEM — Z13.220 SCREENING FOR HYPERLIPIDEMIA: Status: RESOLVED | Noted: 2022-05-04 | Resolved: 2023-05-05

## 2023-05-06 ENCOUNTER — APPOINTMENT (OUTPATIENT)
Dept: LAB | Facility: CLINIC | Age: 58
End: 2023-05-06

## 2023-05-06 DIAGNOSIS — Z00.00 MEDICARE ANNUAL WELLNESS VISIT, SUBSEQUENT: ICD-10-CM

## 2023-05-06 DIAGNOSIS — Z13.1 SCREENING FOR DIABETES MELLITUS: ICD-10-CM

## 2023-05-06 DIAGNOSIS — Z12.5 SCREENING FOR PROSTATE CANCER: ICD-10-CM

## 2023-05-06 DIAGNOSIS — Z13.220 SCREENING FOR HYPERLIPIDEMIA: ICD-10-CM

## 2023-05-06 DIAGNOSIS — Z13.21 ENCOUNTER FOR VITAMIN DEFICIENCY SCREENING: ICD-10-CM

## 2023-05-06 LAB
25(OH)D3 SERPL-MCNC: 23.2 NG/ML (ref 30–100)
ALBUMIN SERPL BCP-MCNC: 4.4 G/DL (ref 3.5–5)
ALP SERPL-CCNC: 36 U/L (ref 46–116)
ALT SERPL W P-5'-P-CCNC: 64 U/L (ref 12–78)
ANION GAP SERPL CALCULATED.3IONS-SCNC: 5 MMOL/L (ref 4–13)
AST SERPL W P-5'-P-CCNC: 26 U/L (ref 5–45)
BASOPHILS # BLD AUTO: 0.04 THOUSANDS/ÂΜL (ref 0–0.1)
BASOPHILS NFR BLD AUTO: 1 % (ref 0–1)
BILIRUB SERPL-MCNC: 0.44 MG/DL (ref 0.2–1)
BUN SERPL-MCNC: 14 MG/DL (ref 5–25)
CALCIUM SERPL-MCNC: 9.3 MG/DL (ref 8.3–10.1)
CHLORIDE SERPL-SCNC: 105 MMOL/L (ref 96–108)
CHOLEST SERPL-MCNC: 184 MG/DL
CO2 SERPL-SCNC: 27 MMOL/L (ref 21–32)
CREAT SERPL-MCNC: 0.8 MG/DL (ref 0.6–1.3)
EOSINOPHIL # BLD AUTO: 0.14 THOUSAND/ÂΜL (ref 0–0.61)
EOSINOPHIL NFR BLD AUTO: 2 % (ref 0–6)
ERYTHROCYTE [DISTWIDTH] IN BLOOD BY AUTOMATED COUNT: 12.8 % (ref 11.6–15.1)
GFR SERPL CREATININE-BSD FRML MDRD: 97 ML/MIN/1.73SQ M
GLUCOSE P FAST SERPL-MCNC: 86 MG/DL (ref 65–99)
HCT VFR BLD AUTO: 48.8 % (ref 36.5–49.3)
HDLC SERPL-MCNC: 47 MG/DL
HGB BLD-MCNC: 16.2 G/DL (ref 12–17)
IMM GRANULOCYTES # BLD AUTO: 0.03 THOUSAND/UL (ref 0–0.2)
IMM GRANULOCYTES NFR BLD AUTO: 0 % (ref 0–2)
LDLC SERPL CALC-MCNC: 108 MG/DL (ref 0–100)
LYMPHOCYTES # BLD AUTO: 3.59 THOUSANDS/ÂΜL (ref 0.6–4.47)
LYMPHOCYTES NFR BLD AUTO: 51 % (ref 14–44)
MAGNESIUM SERPL-MCNC: 2.1 MG/DL (ref 1.6–2.6)
MCH RBC QN AUTO: 30 PG (ref 26.8–34.3)
MCHC RBC AUTO-ENTMCNC: 33.2 G/DL (ref 31.4–37.4)
MCV RBC AUTO: 90 FL (ref 82–98)
MONOCYTES # BLD AUTO: 0.43 THOUSAND/ÂΜL (ref 0.17–1.22)
MONOCYTES NFR BLD AUTO: 6 % (ref 4–12)
NEUTROPHILS # BLD AUTO: 2.86 THOUSANDS/ÂΜL (ref 1.85–7.62)
NEUTS SEG NFR BLD AUTO: 40 % (ref 43–75)
NONHDLC SERPL-MCNC: 137 MG/DL
NRBC BLD AUTO-RTO: 0 /100 WBCS
PHOSPHATE SERPL-MCNC: 3.4 MG/DL (ref 2.7–4.5)
PLATELET # BLD AUTO: 196 THOUSANDS/UL (ref 149–390)
PMV BLD AUTO: 11.5 FL (ref 8.9–12.7)
POTASSIUM SERPL-SCNC: 4 MMOL/L (ref 3.5–5.3)
PROT SERPL-MCNC: 7.7 G/DL (ref 6.4–8.4)
PSA SERPL-MCNC: 0.8 NG/ML (ref 0–4)
RBC # BLD AUTO: 5.4 MILLION/UL (ref 3.88–5.62)
SODIUM SERPL-SCNC: 137 MMOL/L (ref 135–147)
TRIGL SERPL-MCNC: 145 MG/DL
VIT B12 SERPL-MCNC: 398 PG/ML (ref 100–900)
WBC # BLD AUTO: 7.09 THOUSAND/UL (ref 4.31–10.16)

## 2023-05-07 ENCOUNTER — HOSPITAL ENCOUNTER (EMERGENCY)
Facility: HOSPITAL | Age: 58
Discharge: HOME | End: 2023-05-07
Attending: FAMILY MEDICINE

## 2023-05-07 ENCOUNTER — APPOINTMENT (EMERGENCY)
Dept: RADIOLOGY | Facility: HOSPITAL | Age: 58
End: 2023-05-07

## 2023-05-07 ENCOUNTER — APPOINTMENT (EMERGENCY)
Dept: CT IMAGING | Facility: HOSPITAL | Age: 58
End: 2023-05-07

## 2023-05-07 VITALS
SYSTOLIC BLOOD PRESSURE: 142 MMHG | OXYGEN SATURATION: 96 % | RESPIRATION RATE: 18 BRPM | HEART RATE: 84 BPM | TEMPERATURE: 97.5 F | DIASTOLIC BLOOD PRESSURE: 81 MMHG

## 2023-05-07 DIAGNOSIS — H57.89 EYE SWELLING, LEFT: ICD-10-CM

## 2023-05-07 DIAGNOSIS — S00.81XA FACIAL ABRASION, INITIAL ENCOUNTER: ICD-10-CM

## 2023-05-07 DIAGNOSIS — S00.10XA ECCHYMOSIS OF EYELID: ICD-10-CM

## 2023-05-07 DIAGNOSIS — S09.93XA FACIAL TRAUMA, INITIAL ENCOUNTER: ICD-10-CM
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
## 2023-05-07 LAB
ABO GROUP BLD: NORMAL
BLD GP AB SCN SERPL QL: NEGATIVE
EST. AVERAGE GLUCOSE BLD GHB EST-MCNC: 105 MG/DL
HBA1C MFR BLD: 5.3 %
RH BLD: NEGATIVE
SPECIMEN EXPIRATION DATE: NORMAL

## 2023-05-07 RX ORDER — HYDROCODONE BITARTRATE AND ACETAMINOPHEN 5; 325 MG/1; MG/1
1 TABLET ORAL ONCE
Status: COMPLETED | OUTPATIENT
Start: 2023-05-07 | End: 2023-05-07

## 2023-05-07 RX ADMIN — HYDROCODONE BITARTRATE AND ACETAMINOPHEN 1 TABLET: 5; 325 TABLET ORAL at 15:51

## 2023-05-07 RX ADMIN — IOHEXOL 100 ML: 350 INJECTION, SOLUTION INTRAVENOUS at 14:50

## 2023-05-07 NOTE — Clinical Note
Boyce Carrel was seen and treated in our emergency department on 5/7/2023  Diagnosis:     Laith Villarreal  may return to work on return date  He may return on this date: 05/10/2023         If you have any questions or concerns, please don't hesitate to call        Pam Kahn MD    ______________________________           _______________          _______________  [facility] [place]                              Date                                Time
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
## 2023-05-09 LAB
ATRIAL RATE: 98 BPM
P AXIS: 28 DEGREES
PR INTERVAL: 166 MS
QRS AXIS: 34 DEGREES
QRSD INTERVAL: 102 MS
QT INTERVAL: 344 MS
QTC INTERVAL: 439 MS
T WAVE AXIS: 62 DEGREES
VENTRICULAR RATE: 98 BPM

## 2023-05-10 LAB
A-TOCOPHEROL VIT E SERPL-MCNC: 8.1 MG/L (ref 7–25.1)
GAMMA TOCOPHEROL SERPL-MCNC: 1.6 MG/L (ref 0.5–5.5)
VIT A SERPL-MCNC: 54.1 UG/DL (ref 20.1–62)

## 2023-05-11 ENCOUNTER — TELEPHONE (OUTPATIENT)
Dept: INTERNAL MEDICINE CLINIC | Facility: CLINIC | Age: 58
End: 2023-05-11

## 2023-05-11 LAB
CAROTENE SERPL-MCNC: 11 UG/DL (ref 3–91)
VIT B6 SERPL-MCNC: 14.2 UG/L (ref 3.4–65.2)
ZINC SERPL-MCNC: 114 UG/DL (ref 44–115)

## 2023-05-17 LAB
MISCELLANEOUS LAB TEST RESULT: NORMAL
MISCELLANEOUS LAB TEST RESULT: NORMAL
NIACIN SERPL-MCNC: <5 NG/ML (ref 0–5)
NICOTINAMIDE SERPL-MCNC: 296.4 NG/ML (ref 5.2–72.1)

## 2023-05-18 ENCOUNTER — APPOINTMENT (OUTPATIENT)
Dept: LAB | Facility: CLINIC | Age: 58
End: 2023-05-18

## 2023-05-18 DIAGNOSIS — Z13.1 SCREENING FOR DIABETES MELLITUS: ICD-10-CM

## 2023-05-18 DIAGNOSIS — Z13.220 SCREENING FOR HYPERLIPIDEMIA: ICD-10-CM

## 2023-05-18 DIAGNOSIS — Z12.5 SCREENING FOR PROSTATE CANCER: ICD-10-CM

## 2023-05-18 DIAGNOSIS — Z00.00 MEDICARE ANNUAL WELLNESS VISIT, SUBSEQUENT: ICD-10-CM

## 2023-05-18 DIAGNOSIS — Z13.21 ENCOUNTER FOR VITAMIN DEFICIENCY SCREENING: ICD-10-CM

## 2023-05-24 LAB — VIT B2 BLD-MCNC: 229 UG/L (ref 137–370)

## 2023-05-25 LAB — VIT C SERPL-MCNC: 1.1 MG/DL (ref 0.4–2)

## 2023-05-30 ENCOUNTER — OFFICE VISIT (OUTPATIENT)
Dept: INTERNAL MEDICINE CLINIC | Facility: CLINIC | Age: 58
End: 2023-05-30

## 2023-05-30 VITALS
WEIGHT: 240.8 LBS | HEIGHT: 73 IN | BODY MASS INDEX: 31.91 KG/M2 | OXYGEN SATURATION: 97 % | HEART RATE: 71 BPM | SYSTOLIC BLOOD PRESSURE: 120 MMHG | TEMPERATURE: 97.1 F | DIASTOLIC BLOOD PRESSURE: 80 MMHG

## 2023-05-30 DIAGNOSIS — R35.1 NOCTURIA: ICD-10-CM

## 2023-05-30 DIAGNOSIS — R63.8: ICD-10-CM

## 2023-05-30 DIAGNOSIS — M25.562 CHRONIC PAIN OF BOTH KNEES: ICD-10-CM

## 2023-05-30 DIAGNOSIS — E55.9 VITAMIN D DEFICIENCY: ICD-10-CM

## 2023-05-30 DIAGNOSIS — Z13.21 ENCOUNTER FOR VITAMIN DEFICIENCY SCREENING: ICD-10-CM

## 2023-05-30 DIAGNOSIS — E66.09 CLASS 1 OBESITY DUE TO EXCESS CALORIES WITHOUT SERIOUS COMORBIDITY WITH BODY MASS INDEX (BMI) OF 31.0 TO 31.9 IN ADULT: ICD-10-CM

## 2023-05-30 DIAGNOSIS — Z12.5 SCREENING FOR PROSTATE CANCER: ICD-10-CM

## 2023-05-30 DIAGNOSIS — Z86.19 H/O COLD SORES: ICD-10-CM

## 2023-05-30 DIAGNOSIS — G89.29 CHRONIC BILATERAL BACK PAIN, UNSPECIFIED BACK LOCATION: ICD-10-CM
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
## 2023-05-30 DIAGNOSIS — M54.9 CHRONIC BILATERAL BACK PAIN, UNSPECIFIED BACK LOCATION: ICD-10-CM

## 2023-05-30 DIAGNOSIS — M25.561 CHRONIC PAIN OF BOTH KNEES: ICD-10-CM

## 2023-05-30 DIAGNOSIS — Z00.00 MEDICARE ANNUAL WELLNESS VISIT, SUBSEQUENT: Primary | ICD-10-CM

## 2023-05-30 DIAGNOSIS — S20.461D TICK BITE OF RIGHT BACK WALL OF THORAX, SUBSEQUENT ENCOUNTER: ICD-10-CM

## 2023-05-30 DIAGNOSIS — G89.29 CHRONIC PAIN OF BOTH KNEES: ICD-10-CM
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
## 2023-05-30 DIAGNOSIS — R19.5 POSITIVE COLORECTAL CANCER SCREENING USING COLOGUARD TEST: ICD-10-CM

## 2023-05-30 PROBLEM — Z98.890 HISTORY OF COLONOSCOPY: Status: ACTIVE | Noted: 2023-05-30

## 2023-05-30 PROBLEM — S20.461A TICK BITE OF RIGHT BACK WALL OF THORAX: Status: ACTIVE | Noted: 2023-05-30
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
## 2023-05-30 RX ORDER — ONDANSETRON 4 MG/1
TABLET, FILM COATED ORAL
COMMUNITY
Start: 2023-03-30

## 2023-05-30 RX ORDER — PENCICLOVIR 10 MG/G
CREAM TOPICAL
Qty: 1.5 G | Refills: 0 | Status: SHIPPED | OUTPATIENT
Start: 2023-05-30

## 2023-05-30 RX ORDER — MELATONIN
1000 DAILY
COMMUNITY

## 2023-05-30 RX ORDER — ZINC SULFATE 50(220)MG
220 CAPSULE ORAL DAILY
COMMUNITY

## 2023-05-30 RX ORDER — POLYETHYLENE GLYCOL-3350 AND ELECTROLYTES 236; 6.74; 5.86; 2.97; 22.74 G/274.31G; G/274.31G; G/274.31G; G/274.31G; G/274.31G
POWDER, FOR SOLUTION ORAL
COMMUNITY
Start: 2023-03-30 | End: 2023-05-30

## 2023-05-30 RX ORDER — IBUPROFEN 200 MG/1
TABLET ORAL
COMMUNITY
Start: 2023-03-30 | End: 2023-05-30

## 2023-05-30 NOTE — ASSESSMENT & PLAN NOTE
• Lifestyle modification, diet and exercise discussed  • Medications discussed and refilled appropriately  • Labs discussed and ordered   • Hepatitis C screening discussed  • HIV screening discussed  • Depression screening performed  • Anxiety screening performed  • Urinary Incontinence screening performed   • BMI discussed  • Colorectal cancer screening discussed and ordered   • Fall screening performed

## 2023-05-30 NOTE — ASSESSMENT & PLAN NOTE
Component Ref Range & Units 5/6/23  7:11 AM   NICOTINAMIDE 5 2 - 72 1 ng/mL 296 4 High     Comment: Results confirmed on   dilution  NICOTINIC ACID 0 0 - 5 0 ng/mL <5 0         · Toxicity when eating foods containing niacin is rare, but can occur from long-term use of high-dose supplements  A reddened skin flush with itchiness or tingling on the face, arms, and chest is a common sign  · side-effects of niacin therapy, including an uncomfortable flushing sensation, dermatitis (skin inflammation), heartburn, aggravation of peptic ulcers, increased blood sugar, increased panic and anxiety, and elevation of liver enzymes, which may indicate damage to liver cells
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
## 2023-07-29 PROBLEM — Z00.00 MEDICARE ANNUAL WELLNESS VISIT, SUBSEQUENT: Status: RESOLVED | Noted: 2021-03-23 | Resolved: 2023-07-29

## 2023-07-29 PROBLEM — Z12.5 SCREENING FOR PROSTATE CANCER: Status: RESOLVED | Noted: 2023-03-06 | Resolved: 2023-07-29

## 2023-08-21 ENCOUNTER — RA CDI HCC (OUTPATIENT)
Dept: OTHER | Facility: HOSPITAL | Age: 58
End: 2023-08-21

## 2023-08-21 NOTE — PROGRESS NOTES
[address] [facility] coding opportunities       Chart reviewed, no opportunity found: [address]        Patients Insurance     Medicare Insurance: [facility]

## 2023-09-21 ENCOUNTER — TELEPHONE (OUTPATIENT)
Dept: INTERNAL MEDICINE CLINIC | Facility: CLINIC | Age: 58
End: 2023-09-21

## 2023-09-21 NOTE — TELEPHONE ENCOUNTER
Patient would like a referral to general surgeon, when he went for his colonoscopy the MD had said something about it.  He's had leg pain and says it's above his L thigh and abdomen

## 2023-09-22 ENCOUNTER — OFFICE VISIT (OUTPATIENT)
Dept: INTERNAL MEDICINE CLINIC | Facility: CLINIC | Age: 58
End: 2023-09-22
Payer: COMMERCIAL

## 2023-09-22 ENCOUNTER — TELEPHONE (OUTPATIENT)
Dept: INTERNAL MEDICINE CLINIC | Facility: CLINIC | Age: 58
End: 2023-09-22

## 2023-09-22 VITALS
TEMPERATURE: 98.5 F | DIASTOLIC BLOOD PRESSURE: 86 MMHG | OXYGEN SATURATION: 97 % | HEART RATE: 75 BPM | HEIGHT: 73 IN | WEIGHT: 238.6 LBS | SYSTOLIC BLOOD PRESSURE: 134 MMHG | BODY MASS INDEX: 31.62 KG/M2

## 2023-09-22 DIAGNOSIS — M79.605 LEFT LEG PAIN: ICD-10-CM

## 2023-09-22 DIAGNOSIS — R10.30 LOWER ABDOMINAL PAIN: Primary | ICD-10-CM

## 2023-09-22 PROCEDURE — 99214 OFFICE O/P EST MOD 30 MIN: CPT | Performed by: NURSE PRACTITIONER

## 2023-09-22 RX ORDER — OMEPRAZOLE 40 MG/1
CAPSULE, DELAYED RELEASE ORAL
COMMUNITY
Start: 2023-07-17

## 2023-09-22 NOTE — PATIENT INSTRUCTIONS
Problem List Items Addressed This Visit          Other    Lower abdominal pain - Primary     9/22/2023  Left lower quadrant pain for years   This pain causes pain and discomfort down into his left leg  When he wears his back brace, the pain resolves   Referral to general surgery          Relevant Orders    US abdomen complete    Left leg pain    Relevant Orders    US abdomen complete

## 2023-09-22 NOTE — TELEPHONE ENCOUNTER
I put in a referral to general surgery. I put down for abdominal pain - not sure what pain he is having, he may need an appointment with me too.  TY

## 2023-09-22 NOTE — PROGRESS NOTES
Assessment/Plan:     Problem List Items Addressed This Visit        Other    Lower abdominal pain - Primary     9/22/2023  Left lower quadrant pain for years   This pain causes pain and discomfort down into his left leg  When he wears his back brace, the pain resolves   Referral to general surgery          Relevant Orders    US abdomen complete    Left leg pain    Relevant Orders    US abdomen complete       Subjective:      Patient ID: Maddi Patel is a 60 y.o. male. The patient is here today to discuss his left lower quadrant pain - this may be a hernia or scar tissue. Please continue to the [facility] section of the note for details of today's visit. The following portions of the patient's history were reviewed and updated as appropriate:     Past Medical History:  He has a past medical history of Arthritis, BPH without urinary obstruction, Erectile dysfunction, Urethral stricture, and Weak urinary stream.,  _______________________________________________________________________  Medical Problems:  does not have any pertinent problems on file.,  _______________________________________________________________________  Past Surgical History:   has a past surgical history that includes Back surgery; Cystoscopy; Urethral dilation; Foot surgery; and Knee surgery. ,  _______________________________________________________________________  Family History:  family history includes Diabetes in his mother; Heart disease in his father; Stroke in his father.,  _______________________________________________________________________  Social History:   reports that he quit smoking about 25 years ago. His smoking use included cigarettes. He has never used smokeless tobacco. He reports current alcohol use. He reports that he does not use drugs. ,  _______________________________________________________________________  Allergies:  has No Known Allergies. .  _______________________________________________________________________  Current Outpatient Medications   Medication Sig Dispense Refill   • cholecalciferol (VITAMIN D3) 1,000 units tablet Take 1,000 Units by mouth daily     • ibuprofen (MOTRIN) 600 mg tablet Take 1 tablet (600 mg total) by mouth every 6 (six) hours as needed for mild pain 120 tablet 0   • omeprazole (PriLOSEC) 40 MG capsule take 1 capsule by mouth once daily BEFORE A MEAL     • penciclovir (DENAVIR) 1 % cream Apply topically every 2 (two) hours 1.5 g 0   • POTASSIUM CHLORIDE ER PO Take by mouth     • zinc sulfate (ZINCATE) 220 mg capsule Take 220 mg by mouth daily     • ondansetron (ZOFRAN) 4 mg tablet TAKE 1 TABLET BY MOUOTH 1 HOUR PRIOR TO EACH DOSE OF PREP (Patient not taking: Reported on 9/22/2023)       No current facility-administered medications for this visit.     _______________________________________________________________________      Review of Systems   Gastrointestinal: Positive for abdominal pain. Musculoskeletal: Positive for arthralgias and myalgias. Objective:  Vitals:    09/22/23 1251   BP: 134/86   Pulse: 75   Temp: 98.5 °F (36.9 °C)   SpO2: 97%   Weight: 108 kg (238 lb 9.6 oz)   Height: 6' 1" (1.854 m)     Body mass index is 31.48 kg/m².      Physical Exam  Abdominal:       Musculoskeletal:        Legs:

## 2023-09-22 NOTE — ASSESSMENT & PLAN NOTE
· 9/22/2023  · Left lower quadrant pain for years   · This pain causes pain and discomfort down into his left leg  · When he wears his back brace, the pain resolves   · Referral to general surgery

## 2023-09-25 ENCOUNTER — TELEPHONE (OUTPATIENT)
Dept: ADMINISTRATIVE | Facility: OTHER | Age: 58
End: 2023-09-25

## 2023-09-25 NOTE — TELEPHONE ENCOUNTER
Upon review of the In Basket request and the patient's chart, initial outreach has been made via fax to facility. Please see Contacts section for details.      Thank you  Peyton Shanks

## 2023-09-25 NOTE — LETTER
Procedure Request Form: Colonoscopy      Date Requested: 23  Patient: Hugh Qiu  Patient : 1965   Referring Provider: LANA Ambrosio        Date of Procedure ______________________________       The above patient has informed us that they have completed their   most recent Colonoscopy at your facility. Please complete   this form and attach all corresponding procedure reports/results. Comments DOS: 2023 or most recent   ____________________________________________________________________  ____________________________________________________________________  ____________________________________________________________________    Facility Completing Procedure _________________________________________    Form Completed By (print name) _______________________________________      Signature __________________________________________________________      These reports are needed for  compliance. Please fax this completed form and a copy of the procedure report to our office located at [address] [place] [address] as soon as possible to Fax [phone] attention Perfecto Florala: Phone [phone]    We thank you for your assistance in treating our mutual patient.

## 2023-09-25 NOTE — LETTER
Procedure Request Form: Colonoscopy      Date Requested: 10/04/23  Patient: Rom Jewel  Patient : 1965   Referring Provider: LANA Mays        Date of Procedure ______________________________       The above patient has informed us that they have completed their   most recent Colonoscopy at your facility. Please complete   this form and attach all corresponding procedure reports/results. Comments __________________________________________________________  ____________________________________________________________________  ____________________________________________________________________  ____________________________________________________________________    Facility Completing Procedure _________________________________________    Form Completed By (print name) _______________________________________      Signature __________________________________________________________      These reports are needed for  compliance. Please fax this completed form and a copy of the procedure report to our office located at [address] [place] [address] as soon as possible to Fax [phone] attention Jeremy Boucher: Phone [phone]    We thank you for your assistance in treating our mutual patient.

## 2023-09-25 NOTE — TELEPHONE ENCOUNTER
----- Message from Marce Hernandezutant sent at 9/22/2023 12:57 PM EDT -----  Regarding: colonoscopy  09/22/23 12:58 PM    Hello, our patient Fifi Meyers has had CRC: Colonoscopy completed/performed. Please assist in updating the patient chart by making an External outreach to  [facility] [phone] facility located in [facility]. The date of service is 5/2023.     Thank you,  Heaven Carney PG [facility]

## 2023-09-27 ENCOUNTER — HOSPITAL ENCOUNTER (OUTPATIENT)
Dept: ULTRASOUND IMAGING | Facility: HOSPITAL | Age: 58
Discharge: HOME | End: 2023-09-27
Payer: COMMERCIAL

## 2023-09-27 DIAGNOSIS — R10.30 LOWER ABDOMINAL PAIN: ICD-10-CM

## 2023-09-27 DIAGNOSIS — M79.605 LEFT LEG PAIN: ICD-10-CM

## 2023-09-27 PROCEDURE — 76857 US EXAM PELVIC LIMITED: CPT

## 2023-10-04 NOTE — TELEPHONE ENCOUNTER
As a follow-up, a second attempt has been made for outreach via fax to facility. Please see Contacts section for details.     Thank you  Victorina Bryant

## 2023-10-13 NOTE — TELEPHONE ENCOUNTER
As a final attempt, a third outreach has been made via telephone call to facility. Please see Contacts section for details. This encounter will be closed and completed by end of day. Should we receive the requested information because of previous outreach attempts, the requested patient's chart will be updated appropriately.      Thank you  Claudetta Smoker

## 2023-10-19 NOTE — PROGRESS NOTES
Assessment/Plan:    Umbilical hernia without obstruction and without gangrene  Patient is a pleasant 65-year-old male with the past medical history significant for chronic back pain status post anterior approach spine surgery several years ago presenting with complaints of left leg pain and concerns about a possible incisional hernia as the etiology of his symptom complex. On physical examination he is a well-nourished well-developed overweight male. Competent reliable as a historian. He is in no acute distress. Inspection and palpation of the lower abdomen reveals a well-healed left paramedian scar without signs of incisional hernia. The patient does have an incidental umbilical hernia soft and reducible at the apex of his incision. Diagnosis of umbilical hernia explained to the patient. The technical details of laparoscopic mesh repair reviewed. Together we decided to follow the umbilical hernia nonoperatively and plan for 6-month follow-up during which time the patient intends to make an earnest effort to lose weight. All questions answered to the satisfaction of the patient. Look forward seeing him back in 6 months time he has been encouraged to follow-up sooner on an as-needed basis. Diagnoses and all orders for this visit:    Umbilical hernia without obstruction and without gangrene    Lower abdominal pain  -     Ambulatory Referral to General Surgery          Subjective:      Patient ID: Gloria Ratliff is a 60 y.o. male. Patient is here for left lower abdominal pain/ possible inc hernia. Patient had back surgery in 2005. Surgery was performed on his lower abd (Experimental). To the left side of the inc he suffers from a throbbing, aching pain that radiates to his left groin and left leg. Patient states at times theres bulging. For pain he takes IBU 800mg and wears a back compression brace which helps with the pain.  Patient denies nausea/vomiting, diarrhea/constipation, issues with urination, trouble eating/drinking or fevers/chills. Sung IQBAL,MA            Review of Systems   Constitutional:  Negative for chills and fever. HENT:  Negative for ear pain and sore throat. Eyes:  Negative for pain and visual disturbance. Respiratory:  Negative for cough and shortness of breath. Cardiovascular:  Negative for chest pain and palpitations. Gastrointestinal:  Negative for abdominal pain and vomiting. Genitourinary:  Negative for dysuria and hematuria. Musculoskeletal:  Negative for arthralgias and back pain. Skin:  Negative for color change and rash. Neurological:  Negative for seizures and syncope. All other systems reviewed and are negative. Objective:      /82 (BP Location: Left arm, Patient Position: Sitting, Cuff Size: Large)   Pulse 73   Temp (!) 97.2 °F (36.2 °C) (Temporal)   Resp 18   Ht 6' 1" (1.854 m)   Wt 110 kg (242 lb)   SpO2 96%   BMI 31.93 kg/m²          Physical Exam  Vitals and nursing note reviewed. Constitutional:       Appearance: He is well-developed. HENT:      Head: Normocephalic and atraumatic. Eyes:      Conjunctiva/sclera: Conjunctivae normal.      Pupils: Pupils are equal, round, and reactive to light. Cardiovascular:      Rate and Rhythm: Normal rate and regular rhythm. Pulmonary:      Effort: Pulmonary effort is normal.      Breath sounds: Normal breath sounds. Abdominal:      General: Bowel sounds are normal.      Palpations: Abdomen is soft. Comments: Reducible umbilical hernia present   Musculoskeletal:         General: Normal range of motion. Cervical back: Normal range of motion and neck supple. Skin:     General: Skin is warm and dry. Neurological:      Mental Status: He is alert and oriented to person, place, and time. Psychiatric:         Behavior: Behavior normal.         Thought Content:  Thought content normal.         Judgment: Judgment normal.

## 2023-10-20 ENCOUNTER — CONSULT (OUTPATIENT)
Dept: SURGERY | Facility: CLINIC | Age: 58
End: 2023-10-20

## 2023-10-20 VITALS
RESPIRATION RATE: 18 BRPM | TEMPERATURE: 97.2 F | OXYGEN SATURATION: 96 % | SYSTOLIC BLOOD PRESSURE: 128 MMHG | WEIGHT: 242 LBS | HEART RATE: 73 BPM | DIASTOLIC BLOOD PRESSURE: 82 MMHG | BODY MASS INDEX: 32.07 KG/M2 | HEIGHT: 73 IN

## 2023-10-20 DIAGNOSIS — R10.30 LOWER ABDOMINAL PAIN: ICD-10-CM

## 2023-10-20 DIAGNOSIS — K42.9 UMBILICAL HERNIA WITHOUT OBSTRUCTION AND WITHOUT GANGRENE: Primary | ICD-10-CM

## 2023-10-20 NOTE — ASSESSMENT & PLAN NOTE
Patient is a pleasant 50-year-old male with the past medical history significant for chronic back pain status post anterior approach spine surgery several years ago presenting with complaints of left leg pain and concerns about a possible incisional hernia as the etiology of his symptom complex. On physical examination he is a well-nourished well-developed overweight male. Competent reliable as a historian. He is in no acute distress. Inspection and palpation of the lower abdomen reveals a well-healed left paramedian scar without signs of incisional hernia. The patient does have an incidental umbilical hernia soft and reducible at the apex of his incision. Diagnosis of umbilical hernia explained to the patient. The technical details of laparoscopic mesh repair reviewed. Together we decided to follow the umbilical hernia nonoperatively and plan for 6-month follow-up during which time the patient intends to make an earnest effort to lose weight. All questions answered to the satisfaction of the patient. Look forward seeing him back in 6 months time he has been encouraged to follow-up sooner on an as-needed basis.

## 2023-11-29 ENCOUNTER — OFFICE VISIT (OUTPATIENT)
Dept: UROLOGY | Facility: CLINIC | Age: 58
End: 2023-11-29
Payer: COMMERCIAL

## 2023-11-29 ENCOUNTER — TELEPHONE (OUTPATIENT)
Dept: UROLOGY | Facility: CLINIC | Age: 58
End: 2023-11-29

## 2023-11-29 VITALS
HEART RATE: 86 BPM | SYSTOLIC BLOOD PRESSURE: 122 MMHG | BODY MASS INDEX: 32.74 KG/M2 | DIASTOLIC BLOOD PRESSURE: 84 MMHG | HEIGHT: 73 IN | OXYGEN SATURATION: 97 % | WEIGHT: 247 LBS

## 2023-11-29 DIAGNOSIS — R10.32 LLQ ABDOMINAL PAIN: Primary | ICD-10-CM

## 2023-11-29 PROCEDURE — 99204 OFFICE O/P NEW MOD 45 MIN: CPT | Performed by: UROLOGY
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
## 2023-12-04 NOTE — TELEPHONE ENCOUNTER
Message left for patient to call office back to confirm Cystoscopy with Dr. Marvel Krishnamurthy at [place] 1/29/24 at 1:30.

## 2023-12-20 ENCOUNTER — APPOINTMENT (OUTPATIENT)
Dept: LAB | Facility: CLINIC | Age: 58
End: 2023-12-20
Payer: COMMERCIAL

## 2023-12-20 ENCOUNTER — NURSE TRIAGE (OUTPATIENT)
Age: 58
End: 2023-12-20

## 2023-12-20 DIAGNOSIS — R31.0 GROSS HEMATURIA: Primary | ICD-10-CM

## 2023-12-20 DIAGNOSIS — R31.0 GROSS HEMATURIA: ICD-10-CM

## 2023-12-20 LAB
BACTERIA UR QL AUTO: NORMAL /HPF
BILIRUB UR QL STRIP: NEGATIVE
CLARITY UR: CLEAR
COLOR UR: NORMAL
GLUCOSE UR STRIP-MCNC: NEGATIVE MG/DL
HGB UR QL STRIP.AUTO: NEGATIVE
KETONES UR STRIP-MCNC: NEGATIVE MG/DL
LEUKOCYTE ESTERASE UR QL STRIP: NEGATIVE
NITRITE UR QL STRIP: NEGATIVE
NON-SQ EPI CELLS URNS QL MICRO: NORMAL /HPF
PH UR STRIP.AUTO: 6 [PH]
PROT UR STRIP-MCNC: NEGATIVE MG/DL
RBC #/AREA URNS AUTO: NORMAL /HPF
SP GR UR STRIP.AUTO: 1.01 (ref 1–1.03)
UROBILINOGEN UR STRIP-ACNC: <2 MG/DL
WBC #/AREA URNS AUTO: NORMAL /HPF

## 2023-12-20 PROCEDURE — 81001 URINALYSIS AUTO W/SCOPE: CPT

## 2023-12-20 PROCEDURE — 87086 URINE CULTURE/COLONY COUNT: CPT

## 2023-12-20 PROCEDURE — 88112 CYTOPATH CELL ENHANCE TECH: CPT | Performed by: PATHOLOGY

## 2023-12-20 NOTE — TELEPHONE ENCOUNTER
Reason for Disposition  • The patient has Gross Hematuria with small clots and able to urinate, new onset, with or without history of bladder cancer    Additional Information  • Negative: The patient has a fever of 101 or higher  • Negative: The patient has persistent vomiting  • Negative: The patient is unable to urinate and offices are closing/closed/after hours  • Negative: The patient has severe uncontrolled pain  • Negative: The patient is on blood thinners    Answer Questions - Initial Assessment  When did this start: Today- clear urine, one piece of stringy blood clot    Do you have dysuria: no    Do you have lower back, flank, or lower abdominal/bladder pain: yes- pelvic area    Do you have urinary frequency, urgency, or changes in your urination:no    Do you have any blood clots: yes    Have you become unable to urinate: NO, still passing urine without an issue, passed the one blood clot.     Is the hematuria continuous or intermittent:intermittent    Do you take any blood thinners: takes baby aspirin for headache    Have you had recent urologic surgery or procedure:no    Have you had any recent urine testing or imaging? (UA with micro, urine culture, kidney ultrasound, CT scan): no    Do you have a history of bladder cancer: no    Have you ever had a workup for blood in the urine: no    Did you recently take rifampin or pyridium: no    Protocols used: Gross Hemaruria  Reviewed bladder irritants, client states they intake everything on the list, and not enough water. Will increase to at least 64oz. Day and avoid bladder irritants in the mean time ED precautions reviewed as test results may take up to 72 hours to result.

## 2023-12-20 NOTE — TELEPHONE ENCOUNTER
Agree with urine testing.  Has cystoscopy scheduled 1/29 with Dr. Rey, should keep this appointment.  Recent CT imaging unremarkable

## 2023-12-21 ENCOUNTER — TELEPHONE (OUTPATIENT)
Age: 58
End: 2023-12-21

## 2023-12-21 LAB — BACTERIA UR CULT: NORMAL

## 2023-12-21 NOTE — TELEPHONE ENCOUNTER
Spoke to patient and informed him that his urine studies are still process and not finalized results yet. Patient states he is currently feeling fine, just some pelvic pain. He did pass one clot this morning with voiding but he is not having any urinary complaints. He is aware that we will reach him once results are final.

## 2023-12-21 NOTE — TELEPHONE ENCOUNTER
Voicemail left for the patient to please call the office to discuss his symptoms.  Urinalysis essentially normal.  Cytology and culture in process.

## 2023-12-22 PROCEDURE — 88112 CYTOPATH CELL ENHANCE TECH: CPT | Performed by: PATHOLOGY

## 2023-12-22 NOTE — TELEPHONE ENCOUNTER
Called and LMOM for patient that urine testing was negative for microscopic blood or infection. Advised urine cytology is pending at this time and office will call with final results.

## 2024-01-17 ENCOUNTER — TELEPHONE (OUTPATIENT)
Dept: UROLOGY | Facility: CLINIC | Age: 59
End: 2024-01-17

## 2024-01-17 NOTE — TELEPHONE ENCOUNTER
Spoke to patient and made him aware that his cystoscopy with Dr. Rey at [facility] is being canceled due provider out of office and he will be called with new appointment.  Patient appreciated the call.

## 2024-03-07 ENCOUNTER — TELEPHONE (OUTPATIENT)
Dept: UROLOGY | Facility: CLINIC | Age: 59
End: 2024-03-07

## 2024-03-07 NOTE — TELEPHONE ENCOUNTER
**3/7/24-- [facility] to notify pt. that the 4/17/24 appt. with Dr. Rey is being canceled due to him being in the OR that day and is being rescheduled to 5/21/24.**

## 2024-04-24 ENCOUNTER — OFFICE VISIT (OUTPATIENT)
Dept: INTERNAL MEDICINE CLINIC | Facility: CLINIC | Age: 59
End: 2024-04-24
Payer: COMMERCIAL

## 2024-04-24 VITALS
HEIGHT: 73 IN | HEART RATE: 73 BPM | WEIGHT: 246.38 LBS | OXYGEN SATURATION: 96 % | BODY MASS INDEX: 32.65 KG/M2 | DIASTOLIC BLOOD PRESSURE: 72 MMHG | SYSTOLIC BLOOD PRESSURE: 110 MMHG | TEMPERATURE: 96.7 F

## 2024-04-24 DIAGNOSIS — R79.9 ABNORMAL FINDING OF BLOOD CHEMISTRY, UNSPECIFIED: ICD-10-CM

## 2024-04-24 DIAGNOSIS — Z00.00 MEDICARE ANNUAL WELLNESS VISIT, SUBSEQUENT: ICD-10-CM

## 2024-04-24 DIAGNOSIS — E53.8 VITAMIN B12 DEFICIENCY: ICD-10-CM

## 2024-04-24 DIAGNOSIS — R10.11 RIGHT UPPER QUADRANT ABDOMINAL PAIN: Primary | ICD-10-CM

## 2024-04-24 DIAGNOSIS — Z13.1 SCREENING FOR DIABETES MELLITUS: ICD-10-CM

## 2024-04-24 DIAGNOSIS — B18.2 CHRONIC HEPATITIS C WITHOUT HEPATIC COMA (HCC): ICD-10-CM

## 2024-04-24 DIAGNOSIS — R10.30 LOWER ABDOMINAL PAIN: ICD-10-CM

## 2024-04-24 DIAGNOSIS — Z11.4 SCREENING FOR HIV (HUMAN IMMUNODEFICIENCY VIRUS): ICD-10-CM

## 2024-04-24 DIAGNOSIS — E55.9 VITAMIN D DEFICIENCY: ICD-10-CM

## 2024-04-24 DIAGNOSIS — Z13.220 SCREENING FOR HYPERLIPIDEMIA: ICD-10-CM

## 2024-04-24 DIAGNOSIS — K21.9 GASTROESOPHAGEAL REFLUX DISEASE WITHOUT ESOPHAGITIS: ICD-10-CM

## 2024-04-24 DIAGNOSIS — Z12.5 SCREENING FOR PROSTATE CANCER: ICD-10-CM

## 2024-04-24 PROCEDURE — 99214 OFFICE O/P EST MOD 30 MIN: CPT | Performed by: NURSE PRACTITIONER

## 2024-04-24 PROCEDURE — G2211 COMPLEX E/M VISIT ADD ON: HCPCS | Performed by: NURSE PRACTITIONER

## 2024-04-24 NOTE — ASSESSMENT & PLAN NOTE
His GI provider retired   Referral to GI  He is exercising more   He is taking vitamins and eating better     [place] [employer] for 2 1/2 years  Positive HCV   Number given to person who helps with the  camp issues

## 2024-04-24 NOTE — PROGRESS NOTES
Name: Donnie Amato      : 1965      MRN: [id number]  Encounter Provider: LANA Barclay  Encounter Date: 2024   Encounter department: [facility]    Assessment & Plan     1. Right upper quadrant abdominal pain  Assessment & Plan:  His GI provider retired   Referral to GI  He is exercising more   He is taking vitamins and eating better     [place] [employer] [place] 2 1/2 years  Positive HCV   Number given to person who helps with the  camp issues       2. Screening for HIV (human immunodeficiency virus)  -     HIV 1/2 AG/AB w Reflex [facility] for 2 yr old and above; Future    3. Gastroesophageal reflux disease without esophagitis  Assessment & Plan:  Continue omeprazole       4. Chronic hepatitis C without hepatic coma (HCC)  Assessment & Plan:  2023 CT abdomen and pelvis with fatty liver findings       Component  Ref Range & Units 11/3/20  7:19 AM 11/3/20  7:19 AM   Fibrosis Score  0.00 - 0.21 0.47 High     Fibrosis Stage F1-F2    Necroinflammat Activity Score  0.00 - 0.17 0.31 High     Necroinflammat Activity Grade A1-Minimal activity    Alpha 2-Macroglobulins, Qn  110 - 276 mg/dL 297 High     Haptoglobin  29 - 370 mg/dL 132    Apolipoprotein A-1  101 - 178 mg/dL 140    ALT (SGPT)  0 - 55 IU/L 46 56 R, CM   INTERPRETATIONS Comment    Comment: Quantitative results of 6 biochemical tests are analyzed using  a computational algorithm to provide a quantitative surrogate  marker (0.0-1.0) for liver fibrosis (METAVIR F0-F4) and for  necroinflammatory activity (METAVIR A0-A3).   Fibrosis Scoring: Comment    Comment:       <0.21 = Stage F0 - No fibrosis  0.21 - 0.27 = Stage F0 - F1  0.27 - 0.31 = Stage F1 - Portal fibrosis  0.31 - 0.48 = Stage F1 - F2  0.48 - 0.58 = Stage F2 - Bridging fibrosis with few septa  0.58 - 0.72 = Stage F3 - Bridging fibrosis with many septa  0.72 - 0.74 = Stage F3 - F4        >0.74 = Stage F4 - Cirrhosis   Necroinflamm Activity Scoring: Comment     Comment:       <0.17 = Grade A0 - No Activity  0.17 - 0.29 = Grade A0 - A1  0.29 - 0.36 = Grade A1 - Minimal activity  0.36 - 0.52 = Grade A1 - A2  0.52 - 0.60 = Grade A2 - Moderate activity  0.60 - 0.62 = Grade A2 - A3        >0.62 = Grade A3 - Severe activity   Limitations: Comment    Comment: The negative predictive value of a Fibrotest score <0.31 (absence of  clinically significant fibrosis) was 85% when compared to liver biopsy  in 1,270 HCV infected patients with a 38% prevalence of significant  liver fibrosis (F2, 3 or 4). The positive predictive value of a Fibro-  test score >0.48 (F2, 3, 4) was 61% in that same patient cohort. HCV  FibroSURE is not recommended in patients with Gilbert Disease, acute  hemolysis (e.g. HCV ribavirin therapy mediated hemolysis) acute hepa-  titis of the liver, extra-hepatic cholestasis, transplant patients,  and/or renal insufficiency patients.  Any of these clinical situations  may lead to inaccurate quantitative predictions of fibrosis and  necroinflammatory activity in the liver.   Comment Comment    Comment: This test was developed and its performance characteristics determined  by [employer].  It has not been cleared or approved by the Food and Drug  Administration.  The FDA has determined that such clearance or  approval is not necessary.  For questions regarding this report please contact customer service  at [phone].   BILIRUBIN, TOTAL  0.0 - 1.2 mg/dL 0.5    GGT  0 - 65 IU/L 38      Referral to GI   Will need a NEXUS Letter       Orders:  -     Ambulatory Referral to Gastroenterology; Future    5. Screening for diabetes mellitus  -     Hemoglobin A1C; Future    6. Screening for hyperlipidemia  -     Lipid panel; Future    7. Medicare annual wellness visit, subsequent  -     CBC and Platelet; Future  -     Comprehensive metabolic panel; Future    8. Lower abdominal pain    9. Vitamin D deficiency  -     Vitamin D 25 hydroxy; Future    10. Vitamin B12  deficiency  -     Vitamin B12; Future    11. Screening for prostate cancer  -     PSA, Total Screen; Future    12. Abnormal finding of blood chemistry, unspecified  -     Hemoglobin A1C; Future        Depression Screening and Follow-up Plan: Patient was screened for depression during today's encounter. They screened negative with a PHQ-2 score of 0.        Subjective      The patient is here today to discuss his livery pain and chronic HCV. He also needs a new GI provider. Please continue to the PORCH section of the note for details of today's visit.        Review of Systems   Constitutional:  Negative for activity change, chills, fatigue and fever.   HENT:  Negative for rhinorrhea and sore throat.    Eyes:  Negative for pain.   Respiratory:  Negative for cough and shortness of breath.    Cardiovascular:  Negative for chest pain, palpitations and leg swelling.   Gastrointestinal:  Positive for abdominal pain. Negative for constipation, diarrhea, nausea and vomiting.   Genitourinary:  Negative for difficulty urinating, flank pain, frequency and urgency.   Musculoskeletal:  Negative for gait problem, joint swelling and myalgias.   Skin:  Negative for color change.   Neurological:  Negative for dizziness, weakness, light-headedness and headaches.   Psychiatric/Behavioral:  Negative for sleep disturbance. The patient is not nervous/anxious.    All other systems reviewed and are negative.      Current Outpatient Medications on File Prior to Visit   Medication Sig    cholecalciferol (VITAMIN D3) 1,000 units tablet Take 1,000 Units by mouth daily as needed    ibuprofen (MOTRIN) 600 mg tablet Take 1 tablet (600 mg total) by mouth every 6 (six) hours as needed for mild pain    omeprazole (PriLOSEC) 40 MG capsule take 1 capsule by mouth once daily BEFORE A MEAL    penciclovir (DENAVIR) 1 % cream Apply topically every 2 (two) hours    POTASSIUM CHLORIDE ER PO Take by mouth    zinc sulfate (ZINCATE) 220 mg capsule Take 220 mg  "by mouth daily    [DISCONTINUED] ondansetron (ZOFRAN) 4 mg tablet TAKE 1 TABLET BY MOUOTH 1 HOUR PRIOR TO EACH DOSE OF PREP (Patient not taking: Reported on 9/22/2023)       Objective     /72 (BP Location: Left arm, Patient Position: Sitting)   Pulse 73   Temp (!) 96.7 °F (35.9 °C) (Tympanic)   Ht 6' 1\" (1.854 m)   Wt 112 kg (246 lb 6 oz)   SpO2 96%   BMI 32.51 kg/m²     Physical Exam  Vitals reviewed.   Constitutional:       General: He is awake.      Appearance: Normal appearance. He is well-developed and normal weight.   HENT:      Head: Normocephalic and atraumatic.      Nose: Nose normal.      Mouth/Throat:      Mouth: Mucous membranes are moist.   Eyes:      Extraocular Movements: Extraocular movements intact.   Cardiovascular:      Rate and Rhythm: Normal rate and regular rhythm.      Pulses: Normal pulses.      Heart sounds: Normal heart sounds.   Pulmonary:      Effort: Pulmonary effort is normal.      Breath sounds: Normal breath sounds.   Abdominal:      General: Bowel sounds are normal.      Palpations: Abdomen is soft.      Tenderness: There is abdominal tenderness in the right upper quadrant.   Musculoskeletal:         General: Normal range of motion.      Cervical back: Normal range of motion.      Right lower leg: No edema.      Left lower leg: No edema.   Skin:     General: Skin is warm and dry.   Neurological:      Mental Status: He is alert and oriented to person, place, and time.   Psychiatric:         Attention and Perception: Attention normal.         Mood and Affect: Mood normal.         Speech: Speech normal.         Behavior: Behavior normal. Behavior is cooperative.       LANA Barclay    "

## 2024-04-24 NOTE — ASSESSMENT & PLAN NOTE
5/7/2023 CT abdomen and pelvis with fatty liver findings       Component  Ref Range & Units 11/3/20  7:19 AM 11/3/20  7:19 AM   Fibrosis Score  0.00 - 0.21 0.47 High     Fibrosis Stage F1-F2    Necroinflammat Activity Score  0.00 - 0.17 0.31 High     Necroinflammat Activity Grade A1-Minimal activity    Alpha 2-Macroglobulins, Qn  110 - 276 mg/dL 297 High     Haptoglobin  29 - 370 mg/dL 132    Apolipoprotein A-1  101 - 178 mg/dL 140    ALT (SGPT)  0 - 55 IU/L 46 56 R, CM   INTERPRETATIONS Comment    Comment: Quantitative results of 6 biochemical tests are analyzed using  a computational algorithm to provide a quantitative surrogate  marker (0.0-1.0) for liver fibrosis (METAVIR F0-F4) and for  necroinflammatory activity (METAVIR A0-A3).   Fibrosis Scoring: Comment    Comment:       <0.21 = Stage F0 - No fibrosis  0.21 - 0.27 = Stage F0 - F1  0.27 - 0.31 = Stage F1 - Portal fibrosis  0.31 - 0.48 = Stage F1 - F2  0.48 - 0.58 = Stage F2 - Bridging fibrosis with few septa  0.58 - 0.72 = Stage F3 - Bridging fibrosis with many septa  0.72 - 0.74 = Stage F3 - F4        >0.74 = Stage F4 - Cirrhosis   Necroinflamm Activity Scoring: Comment    Comment:       <0.17 = Grade A0 - No Activity  0.17 - 0.29 = Grade A0 - A1  0.29 - 0.36 = Grade A1 - Minimal activity  0.36 - 0.52 = Grade A1 - A2  0.52 - 0.60 = Grade A2 - Moderate activity  0.60 - 0.62 = Grade A2 - A3        >0.62 = Grade A3 - Severe activity   Limitations: Comment    Comment: The negative predictive value of a Fibrotest score <0.31 (absence of  clinically significant fibrosis) was 85% when compared to liver biopsy  in 1,270 HCV infected patients with a 38% prevalence of significant  liver fibrosis (F2, 3 or 4). The positive predictive value of a Fibro-  test score >0.48 (F2, 3, 4) was 61% in that same patient cohort. HCV  FibroSURE is not recommended in patients with Gilbert Disease, acute  hemolysis (e.g. HCV ribavirin therapy mediated hemolysis) acute hepa-  titis of  the liver, extra-hepatic cholestasis, transplant patients,  and/or renal insufficiency patients.  Any of these clinical situations  may lead to inaccurate quantitative predictions of fibrosis and  necroinflammatory activity in the liver.   Comment Comment    Comment: This test was developed and its performance characteristics determined  by [employer].  It has not been cleared or approved by the Food and Drug  Administration.  The FDA has determined that such clearance or  approval is not necessary.  For questions regarding this report please contact customer service  at [phone].   BILIRUBIN, TOTAL  0.0 - 1.2 mg/dL 0.5    GGT  0 - 65 IU/L 38      Referral to GI   Will need a NEXUS Letter

## 2024-04-26 ENCOUNTER — APPOINTMENT (OUTPATIENT)
Dept: LAB | Facility: CLINIC | Age: 59
End: 2024-04-26
Payer: COMMERCIAL

## 2024-04-26 DIAGNOSIS — Z13.1 SCREENING FOR DIABETES MELLITUS: ICD-10-CM

## 2024-04-26 DIAGNOSIS — Z12.5 SCREENING FOR PROSTATE CANCER: ICD-10-CM

## 2024-04-26 DIAGNOSIS — E53.8 VITAMIN B12 DEFICIENCY: ICD-10-CM

## 2024-04-26 DIAGNOSIS — Z11.4 SCREENING FOR HIV (HUMAN IMMUNODEFICIENCY VIRUS): ICD-10-CM

## 2024-04-26 DIAGNOSIS — R79.9 ABNORMAL FINDING OF BLOOD CHEMISTRY, UNSPECIFIED: ICD-10-CM

## 2024-04-26 DIAGNOSIS — Z13.220 SCREENING FOR HYPERLIPIDEMIA: ICD-10-CM

## 2024-04-26 DIAGNOSIS — E55.9 VITAMIN D DEFICIENCY: ICD-10-CM

## 2024-04-26 DIAGNOSIS — Z00.00 MEDICARE ANNUAL WELLNESS VISIT, SUBSEQUENT: ICD-10-CM

## 2024-04-26 LAB
25(OH)D3 SERPL-MCNC: 33.3 NG/ML (ref 30–100)
ALBUMIN SERPL BCP-MCNC: 4.7 G/DL (ref 3.5–5)
ALP SERPL-CCNC: 31 U/L (ref 34–104)
ALT SERPL W P-5'-P-CCNC: 39 U/L (ref 7–52)
ANION GAP SERPL CALCULATED.3IONS-SCNC: 9 MMOL/L (ref 4–13)
AST SERPL W P-5'-P-CCNC: 26 U/L (ref 13–39)
BILIRUB SERPL-MCNC: 0.55 MG/DL (ref 0.2–1)
BUN SERPL-MCNC: 16 MG/DL (ref 5–25)
CALCIUM SERPL-MCNC: 9.6 MG/DL (ref 8.4–10.2)
CHLORIDE SERPL-SCNC: 105 MMOL/L (ref 96–108)
CHOLEST SERPL-MCNC: 181 MG/DL
CO2 SERPL-SCNC: 26 MMOL/L (ref 21–32)
CREAT SERPL-MCNC: 0.84 MG/DL (ref 0.6–1.3)
ERYTHROCYTE [DISTWIDTH] IN BLOOD BY AUTOMATED COUNT: 13 % (ref 11.6–15.1)
EST. AVERAGE GLUCOSE BLD GHB EST-MCNC: 111 MG/DL
GFR SERPL CREATININE-BSD FRML MDRD: 95 ML/MIN/1.73SQ M
GLUCOSE P FAST SERPL-MCNC: 89 MG/DL (ref 65–99)
HBA1C MFR BLD: 5.5 %
HCT VFR BLD AUTO: 45.5 % (ref 36.5–49.3)
HDLC SERPL-MCNC: 50 MG/DL
HGB BLD-MCNC: 15.2 G/DL (ref 12–17)
HIV 1+2 AB+HIV1 P24 AG SERPL QL IA: NORMAL
HIV 2 AB SERPL QL IA: NORMAL
HIV1 AB SERPL QL IA: NORMAL
HIV1 P24 AG SERPL QL IA: NORMAL
LDLC SERPL CALC-MCNC: 111 MG/DL (ref 0–100)
MCH RBC QN AUTO: 30 PG (ref 26.8–34.3)
MCHC RBC AUTO-ENTMCNC: 33.4 G/DL (ref 31.4–37.4)
MCV RBC AUTO: 90 FL (ref 82–98)
NONHDLC SERPL-MCNC: 131 MG/DL
PLATELET # BLD AUTO: 202 THOUSANDS/UL (ref 149–390)
PMV BLD AUTO: 10.9 FL (ref 8.9–12.7)
POTASSIUM SERPL-SCNC: 3.9 MMOL/L (ref 3.5–5.3)
PROT SERPL-MCNC: 7 G/DL (ref 6.4–8.4)
PSA SERPL-MCNC: 0.94 NG/ML (ref 0–4)
RBC # BLD AUTO: 5.06 MILLION/UL (ref 3.88–5.62)
SODIUM SERPL-SCNC: 140 MMOL/L (ref 135–147)
TRIGL SERPL-MCNC: 99 MG/DL
VIT B12 SERPL-MCNC: 264 PG/ML (ref 180–914)
WBC # BLD AUTO: 6.98 THOUSAND/UL (ref 4.31–10.16)

## 2024-04-26 PROCEDURE — 80053 COMPREHEN METABOLIC PANEL: CPT

## 2024-04-26 PROCEDURE — 83036 HEMOGLOBIN GLYCOSYLATED A1C: CPT

## 2024-04-26 PROCEDURE — 80061 LIPID PANEL: CPT

## 2024-04-26 PROCEDURE — 36415 COLL VENOUS BLD VENIPUNCTURE: CPT

## 2024-04-26 PROCEDURE — G0103 PSA SCREENING: HCPCS

## 2024-04-26 PROCEDURE — 82607 VITAMIN B-12: CPT

## 2024-04-26 PROCEDURE — 82306 VITAMIN D 25 HYDROXY: CPT

## 2024-04-26 PROCEDURE — 87389 HIV-1 AG W/HIV-1&-2 AB AG IA: CPT

## 2024-04-26 PROCEDURE — 85027 COMPLETE CBC AUTOMATED: CPT

## 2024-05-01 ENCOUNTER — TELEPHONE (OUTPATIENT)
Dept: INTERNAL MEDICINE CLINIC | Facility: CLINIC | Age: 59
End: 2024-05-01

## 2024-05-01 NOTE — TELEPHONE ENCOUNTER
----- Message from LANA Barclay sent at 5/1/2024  7:40 AM EDT -----  Let him know his labs look good.

## 2024-05-06 ENCOUNTER — TELEPHONE (OUTPATIENT)
Age: 59
End: 2024-05-06

## 2024-05-06 NOTE — TELEPHONE ENCOUNTER
Patient was seen by the  support Bret White as referred by PCP  a message was sent to lauren with the types of labs he recommend. Pls call pt when labs are ordered.

## 2024-05-09 ENCOUNTER — TELEPHONE (OUTPATIENT)
Age: 59
End: 2024-05-09

## 2024-05-21 ENCOUNTER — PROCEDURE VISIT (OUTPATIENT)
Dept: UROLOGY | Facility: CLINIC | Age: 59
End: 2024-05-21
Payer: COMMERCIAL

## 2024-05-21 VITALS
HEART RATE: 84 BPM | DIASTOLIC BLOOD PRESSURE: 80 MMHG | BODY MASS INDEX: 31.89 KG/M2 | OXYGEN SATURATION: 98 % | SYSTOLIC BLOOD PRESSURE: 122 MMHG | WEIGHT: 240.6 LBS | HEIGHT: 73 IN

## 2024-05-21 DIAGNOSIS — R10.32 LLQ ABDOMINAL PAIN: ICD-10-CM

## 2024-05-21 DIAGNOSIS — N35.011 POST-TRAUMATIC BULBOUS URETHRAL STRICTURE: ICD-10-CM

## 2024-05-21 DIAGNOSIS — R31.0 GROSS HEMATURIA: Primary | ICD-10-CM

## 2024-05-21 LAB
SL AMB  POCT GLUCOSE, UA: NORMAL
SL AMB LEUKOCYTE ESTERASE,UA: NORMAL
SL AMB POCT BILIRUBIN,UA: NORMAL
SL AMB POCT BLOOD,UA: NORMAL
SL AMB POCT CLARITY,UA: CLEAR
SL AMB POCT COLOR,UA: YELLOW
SL AMB POCT KETONES,UA: NORMAL
SL AMB POCT NITRITE,UA: NORMAL
SL AMB POCT PH,UA: 6.5
SL AMB POCT SPECIFIC GRAVITY,UA: 1
SL AMB POCT URINE PROTEIN: NORMAL
SL AMB POCT UROBILINOGEN: 0.2

## 2024-05-21 PROCEDURE — 81002 URINALYSIS NONAUTO W/O SCOPE: CPT | Performed by: UROLOGY

## 2024-05-21 PROCEDURE — 52000 CYSTOURETHROSCOPY: CPT | Performed by: UROLOGY

## 2024-05-21 NOTE — PROGRESS NOTES
Cystoscopy     Date/Time  5/21/2024 10:30 AM     Performed by  Delvis Rey MD   Authorized by  Delvis Rey MD         Donnie is a 59-year-old male with a history of urethral stricture disease.  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  He returns in follow-up today to undergo cystoscopy to assess for recurrent stricture disease.  His most recent PSA level from April 2024 is noted to be 0.94.        Male cystoscopy procedure note:  Risk and benefits of flexible cystoscopy were discussed. Informed consent was obtained. The patient was placed in the supine position. His genitalia was prepped and draped in a sterile fashion. Viscous lidocaine jelly was instilled into the urethra and flexible cystoscopy was then performed.  The urethra was inspected and a bulbar urethral stricture was appreciated.  The urethra was patent, however, I could not pass the cystoscope through this area.    Impression: Recurrent bulbar urethral stricture    Plan: When I inspect the stricture it looks somewhat patent with a length of approximately 1 cm.  The lumen is narrowed enough to where I cannot pass the cystoscope, however, the patient is still able to urinate although he has marked lower urinary tract symptoms.  I recommend cystoscopy with cold knife urethrotomy/direct vision internal ureterotomy followed by Optilume paclitaxel balloon inflation to help reduce his risk of recurrent disease and scarring.  Risk of the procedure including, not limited to bleeding, scarring, recurrence, and need for major reconstructive bulbar urethroplasty were discussed and reviewed.  Informed consent obtained.

## 2024-05-21 NOTE — LETTER
May 21, 2024     Sanaz Peña, LANA  [address]  [address]  [place] [address]    Patient: Donnie Amato   YOB: 1965   Date of Visit: 5/21/2024       Dear Dr. Peña:    Thank you for referring Donnie Amato to me for evaluation. Below are my notes for this consultation.    If you have questions, please do not hesitate to call me. I look forward to following your patient along with you.         Sincerely,        Delvis Rey MD        CC: MD Delvis Nicolas MD  5/21/2024 11:14 AM  Sign when Signing Visit     Cystoscopy     Date/Time  5/21/2024 10:30 AM     Performed by  Delvis Rey MD   Authorized by  Delvis Rey MD         Donnie is a 59-year-old male with a history of urethral stricture disease.  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  He returns in follow-up today to undergo cystoscopy to assess for recurrent stricture disease.  His most recent PSA level from April 2024 is noted to be 0.94.        Male cystoscopy procedure note:  Risk and benefits of flexible cystoscopy were discussed. Informed consent was obtained. The patient was placed in the supine position. His genitalia was prepped and draped in a sterile fashion. Viscous lidocaine jelly was instilled into the urethra and flexible cystoscopy was then performed.  The urethra was inspected and a bulbar urethral stricture was appreciated.  The urethra was patent, however, I could not pass the cystoscope through this area.    Impression: Recurrent bulbar urethral stricture    Plan: When I inspect the stricture it looks somewhat patent with a length of approximately 1 cm.  The lumen is narrowed enough to where I cannot pass the cystoscope, however, the patient is still able to urinate although he has marked lower urinary tract symptoms.  I recommend cystoscopy with cold knife urethrotomy/direct vision  internal ureterotomy followed by Optilume paclitaxel balloon inflation to help reduce his risk of recurrent disease and scarring.  Risk of the procedure including, not limited to bleeding, scarring, recurrence, and need for major reconstructive bulbar urethroplasty were discussed and reviewed.  Informed consent obtained.

## 2024-05-24 PROBLEM — Z00.00 MEDICARE ANNUAL WELLNESS VISIT, SUBSEQUENT: Status: RESOLVED | Noted: 2021-03-23 | Resolved: 2024-05-24

## 2024-05-24 PROBLEM — Z13.1 SCREENING FOR DIABETES MELLITUS: Status: RESOLVED | Noted: 2022-05-04 | Resolved: 2024-05-24

## 2024-05-24 PROBLEM — Z12.5 SCREENING FOR PROSTATE CANCER: Status: RESOLVED | Noted: 2023-03-06 | Resolved: 2024-05-24

## 2024-05-24 PROBLEM — Z13.220 SCREENING FOR HYPERLIPIDEMIA: Status: RESOLVED | Noted: 2022-05-04 | Resolved: 2024-05-24

## 2024-05-30 ENCOUNTER — OFFICE VISIT (OUTPATIENT)
Dept: INTERNAL MEDICINE CLINIC | Facility: CLINIC | Age: 59
End: 2024-05-30
Payer: COMMERCIAL

## 2024-05-30 ENCOUNTER — TELEPHONE (OUTPATIENT)
Dept: UROLOGY | Facility: AMBULATORY SURGERY CENTER | Age: 59
End: 2024-05-30

## 2024-05-30 VITALS
BODY MASS INDEX: 32.02 KG/M2 | TEMPERATURE: 97.7 F | OXYGEN SATURATION: 96 % | WEIGHT: 236.38 LBS | SYSTOLIC BLOOD PRESSURE: 110 MMHG | HEIGHT: 72 IN | HEART RATE: 70 BPM | DIASTOLIC BLOOD PRESSURE: 78 MMHG

## 2024-05-30 DIAGNOSIS — T39.1X4D: Chronic | ICD-10-CM

## 2024-05-30 DIAGNOSIS — M25.562 CHRONIC PAIN OF BOTH KNEES: Chronic | ICD-10-CM

## 2024-05-30 DIAGNOSIS — Z98.890 HISTORY OF ESOPHAGOGASTRODUODENOSCOPY (EGD): ICD-10-CM

## 2024-05-30 DIAGNOSIS — R93.7 ABNORMAL X-RAY OF CERVICAL SPINE: ICD-10-CM

## 2024-05-30 DIAGNOSIS — R93.7 ABNORMAL X-RAY OF LUMBAR SPINE: ICD-10-CM

## 2024-05-30 DIAGNOSIS — Z23 ENCOUNTER FOR IMMUNIZATION: ICD-10-CM

## 2024-05-30 DIAGNOSIS — R93.7 ABNORMAL X-RAY OF THORACIC SPINE: ICD-10-CM

## 2024-05-30 DIAGNOSIS — R93.5 ABNORMAL CT OF THE ABDOMEN: ICD-10-CM

## 2024-05-30 DIAGNOSIS — K76.0 HEPATIC STEATOSIS: Chronic | ICD-10-CM

## 2024-05-30 DIAGNOSIS — M54.50 CHRONIC BILATERAL LOW BACK PAIN, UNSPECIFIED WHETHER SCIATICA PRESENT: Chronic | ICD-10-CM

## 2024-05-30 DIAGNOSIS — K21.9 GASTROESOPHAGEAL REFLUX DISEASE WITHOUT ESOPHAGITIS: Chronic | ICD-10-CM

## 2024-05-30 DIAGNOSIS — K22.719 BARRETT'S ESOPHAGUS WITH DYSPLASIA: ICD-10-CM

## 2024-05-30 DIAGNOSIS — Z12.5 SCREENING FOR PROSTATE CANCER: ICD-10-CM

## 2024-05-30 DIAGNOSIS — E55.9 VITAMIN D DEFICIENCY: Chronic | ICD-10-CM

## 2024-05-30 DIAGNOSIS — G89.29 CHRONIC PAIN OF BOTH KNEES: Chronic | ICD-10-CM

## 2024-05-30 DIAGNOSIS — E66.09 CLASS 1 OBESITY DUE TO EXCESS CALORIES WITHOUT SERIOUS COMORBIDITY WITH BODY MASS INDEX (BMI) OF 31.0 TO 31.9 IN ADULT: Chronic | ICD-10-CM

## 2024-05-30 DIAGNOSIS — G89.29 CHRONIC BILATERAL LOW BACK PAIN, UNSPECIFIED WHETHER SCIATICA PRESENT: Chronic | ICD-10-CM

## 2024-05-30 DIAGNOSIS — E53.8 VITAMIN B12 DEFICIENCY: ICD-10-CM

## 2024-05-30 DIAGNOSIS — R93.0 ABNORMAL CT OF THE HEAD: ICD-10-CM

## 2024-05-30 DIAGNOSIS — Z13.1 SCREENING FOR DIABETES MELLITUS: ICD-10-CM

## 2024-05-30 DIAGNOSIS — Z00.00 MEDICARE ANNUAL WELLNESS VISIT, SUBSEQUENT: Primary | ICD-10-CM

## 2024-05-30 DIAGNOSIS — R93.7 ABNORMAL CT SCAN, CERVICAL SPINE: ICD-10-CM

## 2024-05-30 DIAGNOSIS — R10.11 RIGHT UPPER QUADRANT ABDOMINAL PAIN: ICD-10-CM

## 2024-05-30 DIAGNOSIS — Z13.220 SCREENING FOR HYPERLIPIDEMIA: ICD-10-CM

## 2024-05-30 DIAGNOSIS — M25.561 CHRONIC PAIN OF BOTH KNEES: Chronic | ICD-10-CM

## 2024-05-30 DIAGNOSIS — R93.5 ABNORMAL ULTRASOUND OF ABDOMEN: ICD-10-CM

## 2024-05-30 DIAGNOSIS — R76.8 HCV ANTIBODY POSITIVE: ICD-10-CM

## 2024-05-30 DIAGNOSIS — R93.89 ABNORMAL ULTRASOUND OF PELVIS: ICD-10-CM

## 2024-05-30 DIAGNOSIS — B18.2 CHRONIC HEPATITIS C WITHOUT HEPATIC COMA (HCC): Chronic | ICD-10-CM

## 2024-05-30 PROBLEM — T39.1X4A: Chronic | Status: ACTIVE | Noted: 2024-05-30

## 2024-05-30 PROBLEM — M54.9 CHRONIC BILATERAL BACK PAIN: Chronic | Status: ACTIVE | Noted: 2021-03-23

## 2024-05-30 PROBLEM — T39.1X4A: Status: ACTIVE | Noted: 2024-05-30

## 2024-05-30 PROBLEM — E66.811 CLASS 1 OBESITY DUE TO EXCESS CALORIES WITHOUT SERIOUS COMORBIDITY WITH BODY MASS INDEX (BMI) OF 31.0 TO 31.9 IN ADULT: Chronic | Status: ACTIVE | Noted: 2023-03-06

## 2024-05-30 PROCEDURE — G0439 PPPS, SUBSEQ VISIT: HCPCS | Performed by: NURSE PRACTITIONER

## 2024-05-30 PROCEDURE — 99215 OFFICE O/P EST HI 40 MIN: CPT | Performed by: NURSE PRACTITIONER

## 2024-05-30 RX ORDER — UREA 10 %
500 LOTION (ML) TOPICAL DAILY
Qty: 90 TABLET | Refills: 3 | Status: SHIPPED | OUTPATIENT
Start: 2024-05-30

## 2024-05-30 RX ORDER — MELATONIN
2000 DAILY
Qty: 180 TABLET | Refills: 3 | Status: SHIPPED | OUTPATIENT
Start: 2024-05-30

## 2024-05-30 NOTE — ASSESSMENT & PLAN NOTE
Component  Ref Range & Units 4/26/24  7:12 AM 5/6/23  7:11 AM   Vitamin B-12  180 - 914 pg/mL 264 398 R     This is a chronic and stable disease process.   Patient would benefit from B12 supplement  Will monitor labs

## 2024-05-30 NOTE — ASSESSMENT & PLAN NOTE
5/7/2023 CT abdomen and pelvis with fatty liver findings       Component  Ref Range & Units 11/3/20  7:19 AM 11/3/20  7:19 AM   Fibrosis Score  0.00 - 0.21 0.47 High     Fibrosis Stage F1-F2    Necroinflammat Activity Score  0.00 - 0.17 0.31 High     Necroinflammat Activity Grade A1-Minimal activity    Alpha 2-Macroglobulins, Qn  110 - 276 mg/dL 297 High     Haptoglobin  29 - 370 mg/dL 132    Apolipoprotein A-1  101 - 178 mg/dL 140    ALT (SGPT)  0 - 55 IU/L 46 56 R, CM   INTERPRETATIONS Comment    Comment: Quantitative results of 6 biochemical tests are analyzed using  a computational algorithm to provide a quantitative surrogate  marker (0.0-1.0) for liver fibrosis (METAVIR F0-F4) and for  necroinflammatory activity (METAVIR A0-A3).   Fibrosis Scoring: Comment    Comment:       <0.21 = Stage F0 - No fibrosis  0.21 - 0.27 = Stage F0 - F1  0.27 - 0.31 = Stage F1 - Portal fibrosis  0.31 - 0.48 = Stage F1 - F2  0.48 - 0.58 = Stage F2 - Bridging fibrosis with few septa  0.58 - 0.72 = Stage F3 - Bridging fibrosis with many septa  0.72 - 0.74 = Stage F3 - F4        >0.74 = Stage F4 - Cirrhosis   Necroinflamm Activity Scoring: Comment    Comment:       <0.17 = Grade A0 - No Activity  0.17 - 0.29 = Grade A0 - A1  0.29 - 0.36 = Grade A1 - Minimal activity  0.36 - 0.52 = Grade A1 - A2  0.52 - 0.60 = Grade A2 - Moderate activity  0.60 - 0.62 = Grade A2 - A3        >0.62 = Grade A3 - Severe activity   Limitations: Comment    Comment: The negative predictive value of a Fibrotest score <0.31 (absence of  clinically significant fibrosis) was 85% when compared to liver biopsy  in 1,270 HCV infected patients with a 38% prevalence of significant  liver fibrosis (F2, 3 or 4). The positive predictive value of a Fibro-  test score >0.48 (F2, 3, 4) was 61% in that same patient cohort. HCV  FibroSURE is not recommended in patients with Gilbert Disease, acute  hemolysis (e.g. HCV ribavirin therapy mediated hemolysis) acute hepa-  titis of  the liver, extra-hepatic cholestasis, transplant patients,  and/or renal insufficiency patients.  Any of these clinical situations  may lead to inaccurate quantitative predictions of fibrosis and  necroinflammatory activity in the liver.   Comment Comment    Comment: This test was developed and its performance characteristics determined  by [employer].  It has not been cleared or approved by the Food and Drug  Administration.  The FDA has determined that such clearance or  approval is not necessary.  For questions regarding this report please contact customer service  at [phone].   BILIRUBIN, TOTAL  0.0 - 1.2 mg/dL 0.5    GGT  0 - 65 IU/L 38      Referral to GI   Will need a NEXUS Letter     5/30/2024  Re-referral to GI as his previous   Also see HCV Antibody positive tab

## 2024-05-30 NOTE — ASSESSMENT & PLAN NOTE
Lifestyle modification, diet and exercise discussed  Medications discussed and refilled appropriately  Labs discussed and ordered   Depression screening performed  Anxiety screening performed  Urinary Incontinence screening performed   BMI discussed  Fall screening performed

## 2024-05-30 NOTE — ASSESSMENT & PLAN NOTE
Component  Ref Range & Units 4/26/24  7:12 AM 5/6/23  7:11 AM   Vit D, 25-Hydroxy  30.0 - 100.0 ng/mL 33.3 23.2 Low    Comment: Vitamin D guidelines established by Clinical Guidelines Subcommittee  of the Endocrine Society Task Force, 2011    Deficiency <20ng/ml  Insufficiency 20-30ng/ml  Sufficient  ng/ml     This is a chronic and unstable disease process.   Continue supplement but increase to 2000 units daily

## 2024-05-30 NOTE — PATIENT INSTRUCTIONS
Medicare Preventive Visit Patient Instructions  Thank you for completing your Welcome to Medicare Visit or Medicare Annual Wellness Visit today. Your next wellness visit will be due in one year (5/31/2025).  The screening/preventive services that you may require over the next 5-10 years are detailed below. Some tests may not apply to you based off risk factors and/or age. Screening tests ordered at today's visit but not completed yet may show as past due. Also, please note that scanned in results may not display below.  Preventive Screenings:  Service Recommendations Previous Testing/Comments   Colorectal Cancer Screening  Colonoscopy    Fecal Occult Blood Test (FOBT)/Fecal Immunochemical Test (FIT)  Fecal DNA/Cologuard Test  Flexible Sigmoidoscopy Age: 45-75 years old   Colonoscopy: every 10 years (May be performed more frequently if at higher risk)  OR  FOBT/FIT: every 1 year  OR  Cologuard: every 3 years  OR  Sigmoidoscopy: every 5 years  Screening may be recommended earlier than age 45 if at higher risk for colorectal cancer. Also, an individualized decision between you and your healthcare provider will decide whether screening between the ages of 76-85 would be appropriate. Colonoscopy: 04/24/2023  FOBT/FIT: Not on file  Cologuard: 03/08/2023  Sigmoidoscopy: Not on file    Screening Current     Prostate Cancer Screening Individualized decision between patient and health care provider in men between ages of 55-69   Medicare will cover every 12 months beginning on the day after your 50th birthday PSA: 0.94 ng/mL     Screening Current     Hepatitis C Screening Once for adults born between 1945 and 1965  More frequently in patients at high risk for Hepatitis C Hep C Antibody: 11/03/2020    Screening Not Indicated  History Hepatitis C   Diabetes Screening 1-2 times per year if you're at risk for diabetes or have pre-diabetes Fasting glucose: 89 mg/dL (4/26/2024)  A1C: 5.5 % (4/26/2024)  Screening Current    Cholesterol Screening Once every 5 years if you don't have a lipid disorder. May order more often based on risk factors. Lipid panel: 04/26/2024  Screening Current      Other Preventive Screenings Covered by Medicare:  Abdominal Aortic Aneurysm (AAA) Screening: covered once if your at risk. You're considered to be at risk if you have a family history of AAA or a male between the age of 65-75 who smoking at least 100 cigarettes in your lifetime.  Lung Cancer Screening: covers low dose CT scan once per year if you meet all of the following conditions: (1) Age 55-77; (2) No signs or symptoms of lung cancer; (3) Current smoker or have quit smoking within the last 15 years; (4) You have a tobacco smoking history of at least 20 pack years (packs per day x number of years you smoked); (5) You get a written order from a healthcare provider.  Glaucoma Screening: covered annually if you're considered high risk: (1) You have diabetes OR (2) Family history of glaucoma OR (3)  aged 50 and older OR (4)  American aged 65 and older  Osteoporosis Screening: covered every 2 years if you meet one of the following conditions: (1) Have a vertebral abnormality; (2) On glucocorticoid therapy for more than 3 months; (3) Have primary hyperparathyroidism; (4) On osteoporosis medications and need to assess response to drug therapy.  HIV Screening: covered annually if you're between the age of 15-65. Also covered annually if you are younger than 15 and older than 65 with risk factors for HIV infection. For pregnant patients, it is covered up to 3 times per pregnancy.    Immunizations:  Immunization Recommendations   Influenza Vaccine Annual influenza vaccination during flu season is recommended for all persons aged >= 6 months who do not have contraindications   Pneumococcal Vaccine   * Pneumococcal conjugate vaccine = PCV13 (Prevnar 13), PCV15 (Vaxneuvance), PCV20 (Prevnar 20)  * Pneumococcal polysaccharide vaccine =  PPSV23 (Pneumovax) Adults 19-65 yo with certain risk factors or if 65+ yo  If never received any pneumonia vaccine: recommend Prevnar 20 (PCV20)  Give PCV20 if previously received 1 dose of PCV13 or PPSV23   Hepatitis B Vaccine 3 dose series if at intermediate or high risk (ex: diabetes, end stage renal disease, liver disease)   Respiratory syncytial virus (RSV) Vaccine - COVERED BY MEDICARE PART D  * RSVPreF3 (Arexvy) CDC recommends that adults 60 years of age and older may receive a single dose of RSV vaccine using shared clinical decision-making (SCDM)   Tetanus (Td) Vaccine - COST NOT COVERED BY MEDICARE PART B Following completion of primary series, a booster dose should be given every 10 years to maintain immunity against tetanus. Td may also be given as tetanus wound prophylaxis.   Tdap Vaccine - COST NOT COVERED BY MEDICARE PART B Recommended at least once for all adults. For pregnant patients, recommended with each pregnancy.   Shingles Vaccine (Shingrix) - COST NOT COVERED BY MEDICARE PART B  2 shot series recommended in those 19 years and older who have or will have weakened immune systems or those 50 years and older     Health Maintenance Due:      Topic Date Due   • Colorectal Cancer Screening  04/24/2026   • HIV Screening  Completed   • Hepatitis C Screening  Discontinued     Immunizations Due:      Topic Date Due   • Pneumococcal Vaccine: Pediatrics (0 to 5 Years) and At-Risk Patients (6 to 64 Years) (1 of 2 - PCV) Never done   • Hepatitis A Vaccine (2 of 2 - Risk 2-dose series) 05/14/2016   • Hepatitis B Vaccine (2 of 3 - Risk 3-dose series) 06/16/2016   • COVID-19 Vaccine (4 - 2023-24 season) 09/01/2023     Advance Directives   What are advance directives?  Advance directives are legal documents that state your wishes and plans for medical care. These plans are made ahead of time in case you lose your ability to make decisions for yourself. Advance directives can apply to any medical decision, such  as the treatments you want, and if you want to donate organs.   What are the types of advance directives?  There are many types of advance directives, and each state has rules about how to use them. You may choose a combination of any of the following:  Living will:  This is a written record of the treatment you want. You can also choose which treatments you do not want, which to limit, and which to stop at a certain time. This includes surgery, medicine, IV fluid, and tube feedings.   Durable power of  for healthcare (DPAHC):  This is a written record that states who you want to make healthcare choices for you when you are unable to make them for yourself. This person, called a proxy, is usually a family member or a friend. You may choose more than 1 proxy.  Do not resuscitate (DNR) order:  A DNR order is used in case your heart stops beating or you stop breathing. It is a request not to have certain forms of treatment, such as CPR. A DNR order may be included in other types of advance directives.  Medical directive:  This covers the care that you want if you are in a coma, near death, or unable to make decisions for yourself. You can list the treatments you want for each condition. Treatment may include pain medicine, surgery, blood transfusions, dialysis, IV or tube feedings, and a ventilator (breathing machine).  Values history:  This document has questions about your views, beliefs, and how you feel and think about life. This information can help others choose the care that you would choose.  Why are advance directives important?  An advance directive helps you control your care. Although spoken wishes may be used, it is better to have your wishes written down. Spoken wishes can be misunderstood, or not followed. Treatments may be given even if you do not want them. An advance directive may make it easier for your family to make difficult choices about your care.   Weight Management   Why it is  important to manage your weight:  Being overweight increases your risk of health conditions such as heart disease, high blood pressure, type 2 diabetes, and certain types of cancer. It can also increase your risk for osteoarthritis, sleep apnea, and other respiratory problems. Aim for a slow, steady weight loss. Even a small amount of weight loss can lower your risk of health problems.  How to lose weight safely:  A safe and healthy way to lose weight is to eat fewer calories and get regular exercise. You can lose up about 1 pound a week by decreasing the number of calories you eat by 500 calories each day.   Healthy meal plan for weight management:  A healthy meal plan includes a variety of foods, contains fewer calories, and helps you stay healthy. A healthy meal plan includes the following:  Eat whole-grain foods more often.  A healthy meal plan should contain fiber. Fiber is the part of grains, fruits, and vegetables that is not broken down by your body. Whole-grain foods are healthy and provide extra fiber in your diet. Some examples of whole-grain foods are whole-wheat breads and pastas, oatmeal, brown rice, and bulgur.  Eat a variety of vegetables every day.  Include dark, leafy greens such as spinach, kale, va greens, and mustard greens. Eat yellow and orange vegetables such as carrots, sweet potatoes, and winter squash.   Eat a variety of fruits every day.  Choose fresh or canned fruit (canned in its own juice or light syrup) instead of juice. Fruit juice has very little or no fiber.  Eat low-fat dairy foods.  Drink fat-free (skim) milk or 1% milk. Eat fat-free yogurt and low-fat cottage cheese. Try low-fat cheeses such as mozzarella and other reduced-fat cheeses.  Choose meat and other protein foods that are low in fat.  Choose beans or other legumes such as split peas or lentils. Choose fish, skinless poultry (chicken or turkey), or lean cuts of red meat (beef or pork). Before you cook meat or  poultry, cut off any visible fat.   Use less fat and oil.  Try baking foods instead of frying them. Add less fat, such as margarine, sour cream, regular salad dressing and mayonnaise to foods. Eat fewer high-fat foods. Some examples of high-fat foods include french fries, doughnuts, ice cream, and cakes.  Eat fewer sweets.  Limit foods and drinks that are high in sugar. This includes candy, cookies, regular soda, and sweetened drinks.  Exercise:  Exercise at least 30 minutes per day on most days of the week. Some examples of exercise include walking, biking, dancing, and swimming. You can also fit in more physical activity by taking the stairs instead of the elevator or parking farther away from stores. Ask your healthcare provider about the best exercise plan for you.      © Copyright [employer] 2018 Information is for End User's use only and may not be sold, redistributed or otherwise used for commercial purposes. All illustrations and images included in CareNotes® are the copyrighted property of [employer] or [employer]    __________________________________________________    YOU ARE DUE FOR YOUR Medicare Annual WELLNESS EXAM AFTER 5/30/2025.  REPEAT LABS ORDERED, PLEASE HAVE THEM DRAWN THE WEEK PRIOR TO YOUR VISIT (APPROXIMATELY 5/19/2025).  LABS HAVE BEEN ORDERED FOR YOU.   THESE LABS SHOULD BE DRAWN PRIOR TO YOUR NEXT VISIT.  YOU CAN HAVE THEM DRAWN UP TO 2 WEEKS PRIOR TO YOUR NEXT VISIT.  HAVING YOUR BLOOD WORK WHEN YOU COME TO YOUR NEXT VISIT WILL ALLOW ME TO PROVIDE THE BEST MEDICAL CARE FOR YOU WITHOUT HAVING EITHER OF US PERFORM MORE WORK THAN NECESSARY.  PLEASE BE COMPLIANT WITH THIS REQUEST.     Medicare Preventive Visit Patient Instructions  Thank you for completing your Welcome to Medicare Visit or Medicare Annual Wellness Visit today. Your next wellness visit will be due in one year (5/31/2025).  The screening/preventive services that you may require over the next 5-10 years are  detailed below. Some tests may not apply to you based off risk factors and/or age. Screening tests ordered at today's visit but not completed yet may show as past due. Also, please note that scanned in results may not display below.  Preventive Screenings:  Service Recommendations Previous Testing/Comments   Colorectal Cancer Screening  Colonoscopy    Fecal Occult Blood Test (FOBT)/Fecal Immunochemical Test (FIT)  Fecal DNA/Cologuard Test  Flexible Sigmoidoscopy Age: 45-75 years old   Colonoscopy: every 10 years (May be performed more frequently if at higher risk)  OR  FOBT/FIT: every 1 year  OR  Cologuard: every 3 years  OR  Sigmoidoscopy: every 5 years  Screening may be recommended earlier than age 45 if at higher risk for colorectal cancer. Also, an individualized decision between you and your healthcare provider will decide whether screening between the ages of 76-85 would be appropriate. Colonoscopy: 04/24/2023  FOBT/FIT: Not on file  Cologuard: 03/08/2023  Sigmoidoscopy: Not on file    Screening Current     Prostate Cancer Screening Individualized decision between patient and health care provider in men between ages of 55-69   Medicare will cover every 12 months beginning on the day after your 50th birthday PSA: 0.94 ng/mL     Screening Current     Hepatitis C Screening Once for adults born between 1945 and 1965  More frequently in patients at high risk for Hepatitis C Hep C Antibody: 11/03/2020    Screening Not Indicated  History Hepatitis C   Diabetes Screening 1-2 times per year if you're at risk for diabetes or have pre-diabetes Fasting glucose: 89 mg/dL (4/26/2024)  A1C: 5.5 % (4/26/2024)  Screening Current   Cholesterol Screening Once every 5 years if you don't have a lipid disorder. May order more often based on risk factors. Lipid panel: 04/26/2024  Screening Current      Other Preventive Screenings Covered by Medicare:  Abdominal Aortic Aneurysm (AAA) Screening: covered once if your at risk. You're  considered to be at risk if you have a family history of AAA or a male between the age of 65-75 who smoking at least 100 cigarettes in your lifetime.  Lung Cancer Screening: covers low dose CT scan once per year if you meet all of the following conditions: (1) Age 55-77; (2) No signs or symptoms of lung cancer; (3) Current smoker or have quit smoking within the last 15 years; (4) You have a tobacco smoking history of at least 20 pack years (packs per day x number of years you smoked); (5) You get a written order from a healthcare provider.  Glaucoma Screening: covered annually if you're considered high risk: (1) You have diabetes OR (2) Family history of glaucoma OR (3)  aged 50 and older OR (4)  American aged 65 and older  Osteoporosis Screening: covered every 2 years if you meet one of the following conditions: (1) Have a vertebral abnormality; (2) On glucocorticoid therapy for more than 3 months; (3) Have primary hyperparathyroidism; (4) On osteoporosis medications and need to assess response to drug therapy.  HIV Screening: covered annually if you're between the age of 15-65. Also covered annually if you are younger than 15 and older than 65 with risk factors for HIV infection. For pregnant patients, it is covered up to 3 times per pregnancy.    Immunizations:  Immunization Recommendations   Influenza Vaccine Annual influenza vaccination during flu season is recommended for all persons aged >= 6 months who do not have contraindications   Pneumococcal Vaccine   * Pneumococcal conjugate vaccine = PCV13 (Prevnar 13), PCV15 (Vaxneuvance), PCV20 (Prevnar 20)  * Pneumococcal polysaccharide vaccine = PPSV23 (Pneumovax) Adults 19-65 yo with certain risk factors or if 65+ yo  If never received any pneumonia vaccine: recommend Prevnar 20 (PCV20)  Give PCV20 if previously received 1 dose of PCV13 or PPSV23   Hepatitis B Vaccine 3 dose series if at intermediate or high risk (ex: diabetes, end stage  renal disease, liver disease)   Respiratory syncytial virus (RSV) Vaccine - COVERED BY MEDICARE PART D  * RSVPreF3 (Arexvy) CDC recommends that adults 60 years of age and older may receive a single dose of RSV vaccine using shared clinical decision-making (SCDM)   Tetanus (Td) Vaccine - COST NOT COVERED BY MEDICARE PART B Following completion of primary series, a booster dose should be given every 10 years to maintain immunity against tetanus. Td may also be given as tetanus wound prophylaxis.   Tdap Vaccine - COST NOT COVERED BY MEDICARE PART B Recommended at least once for all adults. For pregnant patients, recommended with each pregnancy.   Shingles Vaccine (Shingrix) - COST NOT COVERED BY MEDICARE PART B  2 shot series recommended in those 19 years and older who have or will have weakened immune systems or those 50 years and older     Health Maintenance Due:      Topic Date Due   • Colorectal Cancer Screening  04/24/2026   • HIV Screening  Completed   • Hepatitis C Screening  Discontinued     Immunizations Due:      Topic Date Due   • Pneumococcal Vaccine: Pediatrics (0 to 5 Years) and At-Risk Patients (6 to 64 Years) (1 of 2 - PCV) Never done   • Hepatitis A Vaccine (2 of 2 - Risk 2-dose series) 05/14/2016   • Hepatitis B Vaccine (2 of 3 - Risk 3-dose series) 06/16/2016   • COVID-19 Vaccine (4 - 2023-24 season) 09/01/2023     Advance Directives   What are advance directives?  Advance directives are legal documents that state your wishes and plans for medical care. These plans are made ahead of time in case you lose your ability to make decisions for yourself. Advance directives can apply to any medical decision, such as the treatments you want, and if you want to donate organs.   What are the types of advance directives?  There are many types of advance directives, and each state has rules about how to use them. You may choose a combination of any of the following:  Living will:  This is a written record of the  treatment you want. You can also choose which treatments you do not want, which to limit, and which to stop at a certain time. This includes surgery, medicine, IV fluid, and tube feedings.   Durable power of  for healthcare (DPAHC):  This is a written record that states who you want to make healthcare choices for you when you are unable to make them for yourself. This person, called a proxy, is usually a family member or a friend. You may choose more than 1 proxy.  Do not resuscitate (DNR) order:  A DNR order is used in case your heart stops beating or you stop breathing. It is a request not to have certain forms of treatment, such as CPR. A DNR order may be included in other types of advance directives.  Medical directive:  This covers the care that you want if you are in a coma, near death, or unable to make decisions for yourself. You can list the treatments you want for each condition. Treatment may include pain medicine, surgery, blood transfusions, dialysis, IV or tube feedings, and a ventilator (breathing machine).  Values history:  This document has questions about your views, beliefs, and how you feel and think about life. This information can help others choose the care that you would choose.  Why are advance directives important?  An advance directive helps you control your care. Although spoken wishes may be used, it is better to have your wishes written down. Spoken wishes can be misunderstood, or not followed. Treatments may be given even if you do not want them. An advance directive may make it easier for your family to make difficult choices about your care.   Weight Management   Why it is important to manage your weight:  Being overweight increases your risk of health conditions such as heart disease, high blood pressure, type 2 diabetes, and certain types of cancer. It can also increase your risk for osteoarthritis, sleep apnea, and other respiratory problems. Aim for a slow, steady weight  loss. Even a small amount of weight loss can lower your risk of health problems.  How to lose weight safely:  A safe and healthy way to lose weight is to eat fewer calories and get regular exercise. You can lose up about 1 pound a week by decreasing the number of calories you eat by 500 calories each day.   Healthy meal plan for weight management:  A healthy meal plan includes a variety of foods, contains fewer calories, and helps you stay healthy. A healthy meal plan includes the following:  Eat whole-grain foods more often.  A healthy meal plan should contain fiber. Fiber is the part of grains, fruits, and vegetables that is not broken down by your body. Whole-grain foods are healthy and provide extra fiber in your diet. Some examples of whole-grain foods are whole-wheat breads and pastas, oatmeal, brown rice, and bulgur.  Eat a variety of vegetables every day.  Include dark, leafy greens such as spinach, kale, va greens, and mustard greens. Eat yellow and orange vegetables such as carrots, sweet potatoes, and winter squash.   Eat a variety of fruits every day.  Choose fresh or canned fruit (canned in its own juice or light syrup) instead of juice. Fruit juice has very little or no fiber.  Eat low-fat dairy foods.  Drink fat-free (skim) milk or 1% milk. Eat fat-free yogurt and low-fat cottage cheese. Try low-fat cheeses such as mozzarella and other reduced-fat cheeses.  Choose meat and other protein foods that are low in fat.  Choose beans or other legumes such as split peas or lentils. Choose fish, skinless poultry (chicken or turkey), or lean cuts of red meat (beef or pork). Before you cook meat or poultry, cut off any visible fat.   Use less fat and oil.  Try baking foods instead of frying them. Add less fat, such as margarine, sour cream, regular salad dressing and mayonnaise to foods. Eat fewer high-fat foods. Some examples of high-fat foods include french fries, doughnuts, ice cream, and cakes.  Eat  fewer sweets.  Limit foods and drinks that are high in sugar. This includes candy, cookies, regular soda, and sweetened drinks.  Exercise:  Exercise at least 30 minutes per day on most days of the week. Some examples of exercise include walking, biking, dancing, and swimming. You can also fit in more physical activity by taking the stairs instead of the elevator or parking farther away from stores. Ask your healthcare provider about the best exercise plan for you.      © Copyright [employer] 2018 Information is for End User's use only and may not be sold, redistributed or otherwise used for commercial purposes. All illustrations and images included in CareNotes® are the copyrighted property of [employer] or [employer]

## 2024-05-30 NOTE — PROGRESS NOTES
Ambulatory Visit  Name: Donnie Amato      : 1965      MRN: [id number]  Encounter Provider: LANA Barclay  Encounter Date: 2024   Encounter department: [facility] [place]    Assessment & Plan   1. Medicare annual wellness visit, subsequent  Assessment & Plan:  Lifestyle modification, diet and exercise discussed  Medications discussed and refilled appropriately  Labs discussed and ordered   Depression screening performed  Anxiety screening performed  Urinary Incontinence screening performed   BMI discussed  Fall screening performed  Orders:  -     CBC and differential; Future; Expected date: 2025  -     Comprehensive metabolic panel; Future; Expected date: 2025  2. Encounter for immunization  -     Pneumococcal Conjugate Vaccine 20-valent (Pcv20)  3. Class 1 obesity due to excess calories without serious comorbidity with body mass index (BMI) of 31.0 to 31.9 in adult  Assessment & Plan:  This is a chronic and stable disease process.   Lifestyle modification, diet and exercise discussed  4. Vitamin D deficiency  Assessment & Plan:  Component  Ref Range & Units 24  7:12 AM 23  7:11 AM   Vit D, 25-Hydroxy  30.0 - 100.0 ng/mL 33.3 23.2 Low    Comment: Vitamin D guidelines established by Clinical Guidelines Subcommittee  of the Endocrine Society Task Force, 2011    Deficiency <20ng/ml  Insufficiency 20-30ng/ml  Sufficient  ng/ml     This is a chronic and unstable disease process.   Continue supplement but increase to 2000 units daily   Orders:  -     cholecalciferol (VITAMIN D3) 1,000 units tablet; Take 2 tablets (2,000 Units total) by mouth daily  -     Vitamin D 25 hydroxy; Future; Expected date: 2025  5. Screening for prostate cancer  -     PSA, Total Screen; Future; Expected date: 2025  6. Screening for hyperlipidemia  Assessment & Plan:  Component  Ref Range & Units 24  7:12 AM 23  7:11 AM 22  7:21 AM 21  7:22 AM    Cholesterol  See Comment mg/dL 181 184   R, CM   Comment: Cholesterol:        Pediatric <18 Years        Desirable          <170 mg/dL      Borderline High    170-199 mg/dL      High               >=200 mg/dL        Adult >=18 Years           Desirable         <200 mg/dL      Borderline High   200-239 mg/dL      High             >239 mg/dL   Triglycerides  See Comment mg/dL 99 145   R, CM   Comment: Triglyceride:     0-9Y            <75mg/dL     10Y-17Y         <90 mg/dL       >=18Y     Normal          <150 mg/dL     Borderline High 150-199 mg/dL     High            200-499 mg/dL       Very High       >499 mg/dL    Specimen collection should occur prior to Metamizole administration due to the potential for falsely depressed results.   HDL, Direct  >=40 mg/dL 50 47 CM 41 CM 42 CM   LDL Calculated  0 - 100 mg/dL 111 High  108 High  CM 97  High  CM   Comment: LDL Cholesterol:    Optimal           <100 mg/dl    Near Optimal      100-129 mg/dl    Above Optimal      Borderline High 130-159 mg/dl      High            160-189 mg/dl      Very High       >189 mg/dl     Looks good  No need for medication   Orders:  -     Lipid panel; Future; Expected date: 05/19/2025  7. Screening for diabetes mellitus  -     Hemoglobin A1C; Future; Expected date: 05/19/2025  8. Chronic bilateral low back pain, unspecified whether sciatica present  Assessment & Plan:  Previous T12 fractures in the past with fusion  This is a chronic and stable disease process.   9. Chronic pain of both knees  Assessment & Plan:  This is a chronic and stable disease process.     10. Gastroesophageal reflux disease without esophagitis  Assessment & Plan:  6/19/2023  FINDINGS:  Oropharynx:     Normal  Esophagus:      ??Multiple biopsies taken.                           irregular GE junction --possible COMI BARRETTS  Cardia:             A small hiatal hernia was noted  Fundus:           ??polyps                          Sessile  Polyp of size 0.4 cm. 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 GASTRIC FUNDIC POLYP (BIOPSY):  -Fundic gland polyp with associated nonspecific patchy mild chronic inflammation.  Histologic features of H. pylon gastritis are not identified  B. ESOPHAGEAL BIOPSY:  Deluna's esophagus with glandular atypia indeterminant for dysplasia.  Nonspecific chronic active inflammation (patchy mild activity) and histologic features consistent with gastroesophageal reflux disease are also noted.  See comment.  RX With omeprazole, 40mg before meal   Repeat EGD in 3 months      IMPRESSIONS :  Diaphragmatic hernia without obstruction or gangrene  Benign neoplasm of stomach     SPECIAL INSTRUCTIONS:  Wait for pathology report      5/30/2024  This is a chronic and stable disease process.   Continue omeprazole   GI referral placed   Orders:  -     Ambulatory Referral to Gastroenterology; Future  11. Vitamin B12 deficiency  Assessment & Plan:  Component  Ref Range & Units 4/26/24  7:12 AM 5/6/23  7:11 AM   Vitamin B-12  180 - 914 pg/mL 264 398 R     This is a chronic and stable disease process.   Patient would benefit from B12 supplement  Will monitor labs   Orders:  -     vitamin B-12 (VITAMIN B-12) 500 mcg tablet; Take 1 tablet (500 mcg total) by mouth daily  -     Vitamin B12; Future; Expected date: 05/19/2025  12. Chronic hepatitis C without hepatic coma (HCC)  Assessment & Plan:  5/7/2023 CT abdomen and pelvis with fatty liver findings       Component  Ref Range & Units 11/3/20  7:19 AM 11/3/20  7:19 AM   Fibrosis Score  0.00 - 0.21 0.47 High     Fibrosis Stage F1-F2     Necroinflammat Activity Score  0.00 - 0.17 0.31 High     Necroinflammat Activity Grade A1-Minimal activity    Alpha 2-Macroglobulins, Qn  110 - 276 mg/dL 297 High     Haptoglobin  29 - 370 mg/dL 132    Apolipoprotein A-1  101 - 178 mg/dL 140    ALT (SGPT)  0 - 55 IU/L 46 56 R, CM   INTERPRETATIONS Comment    Comment: Quantitative results of 6 biochemical tests are analyzed using  a computational algorithm to provide a quantitative surrogate  marker (0.0-1.0) for liver fibrosis (METAVIR F0-F4) and for  necroinflammatory activity (METAVIR A0-A3).   Fibrosis Scoring: Comment    Comment:       <0.21 = Stage F0 - No fibrosis  0.21 - 0.27 = Stage F0 - F1  0.27 - 0.31 = Stage F1 - Portal fibrosis  0.31 - 0.48 = Stage F1 - F2  0.48 - 0.58 = Stage F2 - Bridging fibrosis with few septa  0.58 - 0.72 = Stage F3 - Bridging fibrosis with many septa  0.72 - 0.74 = Stage F3 - F4        >0.74 = Stage F4 - Cirrhosis   Necroinflamm Activity Scoring: Comment    Comment:       <0.17 = Grade A0 - No Activity  0.17 - 0.29 = Grade A0 - A1  0.29 - 0.36 = Grade A1 - Minimal activity  0.36 - 0.52 = Grade A1 - A2  0.52 - 0.60 = Grade A2 - Moderate activity  0.60 - 0.62 = Grade A2 - A3        >0.62 = Grade A3 - Severe activity   Limitations: Comment    Comment: The negative predictive value of a Fibrotest score <0.31 (absence of  clinically significant fibrosis) was 85% when compared to liver biopsy  in 1,270 HCV infected patients with a 38% prevalence of significant  liver fibrosis (F2, 3 or 4). The positive predictive value of a Fibro-  test score >0.48 (F2, 3, 4) was 61% in that same patient cohort. HCV  FibroSURE is not recommended in patients with Gilbert Disease, acute  hemolysis (e.g. HCV ribavirin therapy mediated hemolysis) acute hepa-  titis of the liver, extra-hepatic cholestasis, transplant patients,  and/or renal insufficiency patients.  Any of these clinical situations  may lead to inaccurate quantitative predictions of fibrosis  and  necroinflammatory activity in the liver.   Comment Comment    Comment: This test was developed and its performance characteristics determined  by [facility].  It has not been cleared or approved by the Food and Drug  Administration.  The FDA has determined that such clearance or  approval is not necessary.  For questions regarding this report please contact customer service  at [phone].   BILIRUBIN, TOTAL  0.0 - 1.2 mg/dL 0.5    GGT  0 - 65 IU/L 38      Referral to GI   Will need a NEXUS Letter     5/30/2024  Re-referral to GI as his previous   Also see HCV Antibody positive tab   13. Hepatic steatosis  Assessment & Plan:  5/7/2023  FINDINGS:  CHEST   LUNGS:    Lungs are clear.    There is no tracheal or endobronchial lesion.   PLEURA:    Unremarkable.   HEART/GREAT VESSELS:   Heart is unremarkable for patient's age.    No thoracic aortic aneurysm.   MEDIASTINUM AND RIN:    Unremarkable.   CHEST WALL AND LOWER NECK:    Unremarkable.   ABDOMEN   LIVER/BILIARY TREE:   Enlarged fatty liver noted.   GALLBLADDER:    No calcified gallstones.   No pericholecystic inflammatory change.   SPLEEN:    Unremarkable.   PANCREAS:    Unremarkable.   ADRENAL GLANDS:    Unremarkable.   KIDNEYS/URETERS:    Unremarkable.   No hydronephrosis.   STOMACH AND BOWEL:   Diverticular disease without diverticulitis.   Small sliding-type hiatal hernia noted.   APPENDIX:    No findings to suggest appendicitis.   ABDOMINOPELVIC CAVITY:    No ascites.    No pneumoperitoneum.    No lymphadenopathy.   VESSELS:    Unremarkable for patient's age.   PELVIS   REPRODUCTIVE ORGANS:    Unremarkable for patient's age.   URINARY BLADDER:    Unremarkable.   ABDOMINAL WALL/INGUINAL REGIONS:    Unremarkable.   OSSEOUS STRUCTURES:   L5-S1 arthroplasty level noted   IMPRESSION:  No evidence of acute posttraumatic injury throughout the chest, abdomen or pelvis.  14. Poisoning by 4-aminophenol derivatives, undetermined intent, subsequent encounter  -      RF Screen w/ Reflex to Titer; Future  -     Testosterone, Free, Direct; Future  -     Testosterone, free, total; Future  -     Amino acids, plasma; Future  -     MISCELLANEOUS LAB TEST; Future  -     MISCELLANEOUS LAB TEST; Future  15. HCV antibody positive  16. Abnormal ultrasound of abdomen  Assessment & Plan:  12/5/2014  50 YO Male, Liver Disorder, Abnormal LFTs  17. Abnormal x-ray of cervical spine  Assessment & Plan:  6/11/2021  FINDINGS:   No fracture.    Mild reversal of the usual cervical lordosis.   Moderate disc height loss at C6-7.   Minimal disc height loss at C5-6 and to a lesser extent C4-5.   Small marginal osteophytes at C5-C7.    Mild bilateral neuroforaminal narrowing at C6-7 left greater than right.   The prevertebral soft tissues are within normal limits.     The lung apices are clear.   IMPRESSION:   No acute osseous abnormality.   Degenerative changes as above.  18. Abnormal x-ray of lumbar spine  Assessment & Plan:  6/11/2021  FINDINGS:   There are 5 non rib bearing lumbar vertebral bodies.   Transitional S1 vertebral body.   Post L5-S1 arthroplasty.   No evidence of hardware loosening.   There is no evidence of acute fracture or destructive osseous lesion.   Alignment is unremarkable.    Small marginal osteophytes at all lumbar levels most prominent at L1 and L2.   Minimal disc height loss asymmetric to the left at L2-3 and L3-4.   Minimal facet degenerative changes at L5-S1.   The pedicles appear intact.   Soft tissues are unremarkable.   IMPRESSION:   No acute osseous abnormality.   Post L5-S1 arthroplasty.   No evidence of hardware loosening.   Degenerative changes as described.  19. Abnormal x-ray of thoracic spine  Assessment & Plan:  6/11/2021  FINDINGS:   There is no fracture or pathologic bone lesion.   Mild chronic compression fracture of T12.   Thoracic vertebral alignment is within normal limits.   Multilevel thoracic spondylosis suggestive of diffuse idiopathic skeletal  hyperostosis.    There is no displacement of the paraspinal line.    The pedicles appear intact   IMPRESSION:   No acute osseous abnormality.  20. Abnormal CT of the head  Assessment & Plan:  5/7/2023  FINDINGS:   PARENCHYMA:    No intracranial mass, mass effect or midline shift.   No CT signs of acute infarction.    No acute parenchymal hemorrhage.   VENTRICLES AND EXTRA-AXIAL SPACES:    Normal for the patient's age.   VISUALIZED ORBITS:   Normal visualized orbits.   PARANASAL SINUSES:   Normal visualized paranasal sinuses.   CALVARIUM AND EXTRACRANIAL SOFT TISSUES:   Left frontal and periorbital soft tissue swelling with ecchymosis.   Right frontal soft tissue swelling also noted.   IMPRESSION:   No acute intracranial abnormality.    CT Facial Bones  FINDINGS:   FACIAL BONES:     No facial bone fracture identified.    Normal alignment of the temporomandibular joints.    No lytic or blastic lesion.   ORBITS:    Orbital globes, optic nerves, and extraocular muscles appear symmetric and normal.   There is no evidence of retrobulbar mass, abscess, or hematoma.   SINUSES:    Normal.   SOFT TISSUES:   Extensive left periorbital and frontal soft tissue swelling with left-sided probable ecchymosis.   Underlying orbits appear intact.   IMPRESSION:  Posttraumatic soft tissue swelling and left-sided ecchymosis without evidence of facial bone fracture.  21. Abnormal CT scan, cervical spine  Assessment & Plan:  5/7/2023  FINDINGS:   ALIGNMENT:    There is reversal of normal cervical lordosis.    There is no significant subluxation.    No compression deformity.   VERTEBRAE:    No fracture.   DEGENERATIVE CHANGES:    Mild multilevel cervical degenerative changes are noted without critical central canal stenosis.   PREVERTEBRAL AND PARASPINAL SOFT TISSUES:   Unremarkable   THORACIC INLET:    Normal.   IMPRESSION:   No cervical spine fracture or traumatic malalignment.  22. Abnormal CT of the abdomen  Assessment &  Plan:  5/7/2023  FINDINGS:  CHEST   LUNGS:    Lungs are clear.    There is no tracheal or endobronchial lesion.   PLEURA:    Unremarkable.   HEART/GREAT VESSELS:   Heart is unremarkable for patient's age.    No thoracic aortic aneurysm.   MEDIASTINUM AND RIN:    Unremarkable.   CHEST WALL AND LOWER NECK:    Unremarkable.   ABDOMEN   LIVER/BILIARY TREE:   Enlarged fatty liver noted.   GALLBLADDER:    No calcified gallstones.   No pericholecystic inflammatory change.   SPLEEN:    Unremarkable.   PANCREAS:    Unremarkable.   ADRENAL GLANDS:    Unremarkable.   KIDNEYS/URETERS:    Unremarkable.   No hydronephrosis.   STOMACH AND BOWEL:   Diverticular disease without diverticulitis.   Small sliding-type hiatal hernia noted.   APPENDIX:    No findings to suggest appendicitis.   ABDOMINOPELVIC CAVITY:    No ascites.    No pneumoperitoneum.    No lymphadenopathy.   VESSELS:    Unremarkable for patient's age.   PELVIS   REPRODUCTIVE ORGANS:    Unremarkable for patient's age.   URINARY BLADDER:    Unremarkable.   ABDOMINAL WALL/INGUINAL REGIONS:    Unremarkable.   OSSEOUS STRUCTURES:   L5-S1 arthroplasty level noted   IMPRESSION:  No evidence of acute posttraumatic injury throughout the chest, abdomen or pelvis.  23. Abnormal ultrasound of pelvis  Assessment & Plan:  9/27/2023  FINDINGS:   Targeted ultrasound in the area of left lower quadrant pain reveals no suspicious solid or cystic mass.   No fluid collection.   Expected shadowing at the site of surgical scar.   No sonographic evidence for hernia.   Normal sonographic appearance of urinary bladder.   IMPRESSION:   Unremarkable examination with no sonographic abnormality to account for the patient's symptoms.  24. History of esophagogastroduodenoscopy (EGD)  Assessment & Plan:  6/19/2023  FINDINGS:  Oropharynx:     Normal  Esophagus:      ??Multiple biopsies taken.                           irregular GE junction --possible COMI BARRETTS  Cardia:             A small hiatal  hernia was noted  Fundus:           ??polyps                          Sessile Polyp of size 0.4 cm. 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 GASTRIC FUNDIC POLYP (BIOPSY):  -Fundic gland polyp with associated nonspecific patchy mild chronic inflammation.  Histologic features of H. pylon gastritis are not identified  B. ESOPHAGEAL BIOPSY:  Deluna's esophagus with glandular atypia indeterminant for dysplasia.  Nonspecific chronic active inflammation (patchy mild activity) and histologic features consistent with gastroesophageal reflux disease are also noted.  See comment.  RX With omeprazole, 40mg before meal   Repeat EGD in 3 months     IMPRESSIONS :  Diaphragmatic hernia without obstruction or gangrene  Benign neoplasm of stomach    SPECIAL INSTRUCTIONS:  Wait for pathology report  25. Right upper quadrant abdominal pain  Assessment & Plan:  4/24/2024  His GI provider retired   Referral to GI  He is exercising more   He is taking vitamins and eating better     [employer] for 2 1/2 years  Positive HCV   Number given to person who helps with the [employer] issues   26. Deluna's esophagus with dysplasia  Assessment & Plan:  6/19/2023  FINDINGS:  Oropharynx:     Normal  Esophagus:      ??Multiple biopsies taken.                           irregular GE junction --possible COMI BARRETTS  Cardia:             A small hiatal hernia was noted  Fundus:           ??polyps                          Sessile Polyp of size 0.4 cm. 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 GASTRIC FUNDIC POLYP (BIOPSY):  -Fundic gland polyp with associated nonspecific patchy mild chronic inflammation.  Histologic features of H. pylon gastritis are not identified  B. ESOPHAGEAL BIOPSY:  Deluna's esophagus with glandular atypia indeterminant for dysplasia.  Nonspecific chronic active inflammation (patchy mild activity) and histologic features consistent with gastroesophageal reflux disease are also noted.  See comment.  RX With omeprazole, 40mg before meal   Repeat EGD in 3 months      IMPRESSIONS :  Diaphragmatic hernia without obstruction or gangrene  Benign neoplasm of stomach     SPECIAL INSTRUCTIONS:  Wait for pathology report      5/30/2024  This is a chronic and stable disease process.   Continue omeprazole   GI referral placed       Depression Screening and Follow-up Plan: Patient was screened for depression during today's encounter. They screened negative with a PHQ-2 score of 0.      Preventive health issues were discussed with patient, and age appropriate screening tests were ordered as noted in patient's After Visit Summary. Personalized health advice and appropriate referrals for health education or preventive services given if needed, as noted in patient's After Visit Summary.    History of Present Illness     The patient is here today to discuss his Medicare Annual Wellness Visit, and to discuss his chronic history issues since being stationed at [employer]. Please continue to the PORCH section of the note for details of today's visit.         Patient Care Team:  LANA Barclay as PCP - General (Internal Medicine)    Review of Systems   Constitutional:  Negative for activity change, chills, fatigue and fever.    HENT:  Negative for rhinorrhea and sore throat.    Eyes:  Negative for pain.   Respiratory:  Negative for cough and shortness of breath.    Cardiovascular:  Negative for chest pain, palpitations and leg swelling.   Gastrointestinal:  Positive for abdominal pain. Negative for constipation, diarrhea, nausea and vomiting.   Genitourinary:  Negative for difficulty urinating, flank pain, frequency and urgency.   Musculoskeletal:  Positive for arthralgias, back pain and myalgias. Negative for gait problem and joint swelling.   Skin:  Negative for color change.   Neurological:  Negative for dizziness, weakness, light-headedness and headaches.   Psychiatric/Behavioral:  Negative for sleep disturbance. The patient is not nervous/anxious.    All other systems reviewed and are negative.    Medical History Reviewed by provider this encounter:  Tobacco  Allergies  Meds  Problems  Med Hx  Surg Hx  Fam Hx       Annual Wellness Visit Questionnaire   Donnie is here for his Subsequent Wellness visit. Last Medicare Wellness visit information reviewed, patient interviewed and updates made to the record today.      Health Risk Assessment:   Patient rates overall health as good. Patient feels that their physical health rating is slightly worse. Patient is satisfied with their life. Eyesight was rated as slightly worse. Hearing was rated as slightly worse. Patient feels that their emotional and mental health rating is same. Patients states they are sometimes angry. Patient states they are often unusually tired/fatigued. Pain experienced in the last 7 days has been some. Patient's pain rating has been 6/10. Patient states that he has experienced no weight loss or gain in last 6 months.     Depression Screening:   PHQ-2 Score: 0      Fall Risk Screening:   In the past year, patient has experienced: history of falling in past year    Number of falls: 2 or more  Injured during fall?: Yes    Feels unsteady when standing or walking?: Yes     Worried about falling?: No      Home Safety:  Patient has trouble with stairs inside or outside of their home. Patient has working smoke alarms and has working carbon monoxide detector. Home safety hazards include: none.     Nutrition:   Current diet is Low Carb.     Medications:   Patient is not currently taking any over-the-counter supplements. Patient is able to manage medications.     Activities of Daily Living (ADLs)/Instrumental Activities of Daily Living (IADLs):   Walk and transfer into and out of bed and chair?: Yes  Dress and groom yourself?: Yes    Bathe or shower yourself?: Yes    Feed yourself? Yes  Do your laundry/housekeeping?: Yes  Manage your money, pay your bills and track your expenses?: Yes  Make your own meals?: Yes    Do your own shopping?: Yes    Previous Hospitalizations:   Any hospitalizations or ED visits within the last 12 months?: No      Advance Care Planning:   Living will: No    Durable POA for healthcare: Yes    Advanced directive: No      Cognitive Screening:   Provider or family/friend/caregiver concerned regarding cognition?: No    PREVENTIVE SCREENINGS      Cardiovascular Screening:    General: Screening Current      Diabetes Screening:     General: Screening Current      Colorectal Cancer Screening:     General: Screening Current      Prostate Cancer Screening:    General: Screening Current      Abdominal Aortic Aneurysm (AAA) Screening:    Risk factors include: tobacco use        Lung Cancer Screening:     General: Screening Not Indicated      Hepatitis C Screening:    General: Screening Not Indicated and History Hepatitis C    Screening, Brief Intervention, and Referral to Treatment (SBIRT)    Screening  Typical number of drinks in a day: 0  Typical number of drinks in a week: 1  Interpretation: Low risk drinking behavior.    Single Item Drug Screening:  How often have you used an illegal drug (including marijuana) or a prescription medication for non-medical reasons in the  "past year? never    Single Item Drug Screen Score: 0  Interpretation: Negative screen for possible drug use disorder    Brief Intervention  Alcohol & drug use screenings were reviewed. No concerns regarding substance use disorder identified.     Other Counseling Topics:   Car/seat belt/driving safety, skin self-exam, sunscreen and regular weightbearing exercise and calcium and vitamin D intake.     Social Determinants of Health     Financial Resource Strain: Patient Declined (5/30/2023)    Overall Financial Resource Strain (CARDIA)     Difficulty of Paying Living Expenses: Patient declined   Food Insecurity: No Food Insecurity (5/30/2024)    Hunger Vital Sign     Worried About Running Out of Food in the Last Year: Never true     Ran Out of Food in the Last Year: Never true   Transportation Needs: No Transportation Needs (5/30/2024)    PRAPARE - Transportation     Lack of Transportation (Medical): No     Lack of Transportation (Non-Medical): No   Housing Stability: Low Risk  (5/30/2024)    Housing Stability Vital Sign     Unable to Pay for Housing in the Last Year: No     Number of Times Moved in the Last Year: 0     Homeless in the Last Year: No   Utilities: Not At Risk (5/30/2024)    [facility] Utilities     Threatened with loss of utilities: No     No results found.    Objective     /78 (BP Location: Left arm, Patient Position: Sitting)   Pulse 70   Temp 97.7 °F (36.5 °C) (Tympanic)   Ht 5' 11.65\" (1.82 m)   Wt 107 kg (236 lb 6 oz)   SpO2 96%   BMI 32.37 kg/m²     Physical Exam  Vitals and nursing note reviewed.   Constitutional:       General: He is awake.      Appearance: Normal appearance. He is well-developed.   HENT:      Head: Normocephalic and atraumatic.      Nose: Nose normal.      Mouth/Throat:      Mouth: Mucous membranes are moist.      Comments: Chronic GERD and Deluna's esophagus issues   Eyes:      Conjunctiva/sclera: Conjunctivae normal.   Cardiovascular:      Rate and Rhythm: Normal rate and " regular rhythm.      Pulses: Normal pulses.      Heart sounds: Normal heart sounds. No murmur heard.  Pulmonary:      Effort: Pulmonary effort is normal. No respiratory distress.      Breath sounds: Normal breath sounds.   Abdominal:      General: Bowel sounds are normal.      Palpations: Abdomen is soft.      Tenderness: There is abdominal tenderness in the right upper quadrant.   Musculoskeletal:      Cervical back: Neck supple.      Right lower leg: No edema.      Left lower leg: No edema.      Comments: Chronic back pain   Skin:     General: Skin is warm and dry.   Neurological:      Mental Status: He is alert and oriented to person, place, and time.      Motor: Motor function is intact.      Coordination: Coordination is intact.      Gait: Gait is intact.   Psychiatric:         Attention and Perception: Attention normal.         Mood and Affect: Mood normal.         Speech: Speech normal.         Behavior: Behavior normal. Behavior is cooperative.         Thought Content: Thought content normal.         Cognition and Memory: Cognition normal.         Judgment: Judgment normal.       Administrative Statements   I have spent a total time of 75 minutes on 06/02/24 In caring for this patient including Diagnostic results, Prognosis, Risks and benefits of tx options, Instructions for management, Patient and family education, Importance of tx compliance, Risk factor reductions, Impressions, Counseling / Coordination of care, Documenting in the medical record, Reviewing / ordering tests, medicine, procedures  , and Obtaining or reviewing history  .

## 2024-05-30 NOTE — ASSESSMENT & PLAN NOTE
This is a chronic and stable disease process.   Lifestyle modification, diet and exercise discussed

## 2024-05-30 NOTE — LETTER
May 30, 2024    Patient: Donnie Amato  MRN:              [id number]  YOB: 1965  Date of Visit: 5/30/2024    To Whom it May Concern:    I am DAVID Reyes, Board Certified to practice in the specialty of Internal Medicine. I have been asked to write a statement in support of the aforementioned 's claim.    I have personally reviewed his medical history. I have also reviewed and have noted the circumstances and events of his  service, which include his time stationed at [employer], from the dates of February 15, 1983 through June 12, 1985, a total of 28 months of  service.     Donnie Amato is under my professional care since March 23, 2021. Donnie was recently seen in my office on 5/30/2024. His diagnoses include: HCV (Hepatitis C Virus) Antibody Positive, with Genotype and Fibrosure testing completed, Chronic Hepatitis C without Hepatic Coma, GERD (Gastroesophageal Reflux Disease) without esophagitis, Hepatic Steatosis, Deluna's Esophagus Indeterminant for Dysplasia, Chronic Bilateral Back Pain, Right Upper Quadrant Abdominal Pain, Vitamin D Deficiency, and Vitamin B-12 Deficiency.    I am familiar with his history and have examined Mr. Amato often while he has been under my care. He has undergone multiple tests. He has been found to have: Abnormal Ultrasound of the Abdomen, Abnormal CT of the Abdomen, Abnormal Ultrasound of the Pelvis, Abnormal CT of the Abdomen, Abnormal CT of the Head, Abnormal Cervical Spine Xray and CT Scan, Abnormal Thoracic Spine Xray, and an Abnormal Lumbar Spine Xray. He has also had an EGD (Esophagogastroduodenoscopy) with significant findings.     Mr. Amato has no other known risk factors that may have precipitated his current condition. After an extensive review of the pertinent records it is my professional opinion that it is highly likely that Mr. Amato's condition is a direct result of his time spent at [employer] in his  "obligated  service.    In my personal experience and in the medical literature it is known that the  personnel, while stationed at [employer], were exposed to toxic chemicals in their drinking water. For instance, on www.publichealth.[place].gov, an article published on February 29, 2024 titled: [employer] -- A new study says  personnel     stationed at [employer] between 1975 to 1985 had a 20% higher risk for certain types of cancer, was released. The National [employer] of Health also discussed [employer] issues in an article titled: Review of VA Clinical Guidance for the Health Conditions Identified by the [employer] Legislation. An excerpt from this article states \"From 1991 to 1997 the Agency for Toxic Substances and Disease Registry (ATSDR), part of the Centers for Disease Control and Prevention, conducted a public health assessment that evaluated exposures and potential risks at [employer]. It also performed a historical reconstruction of the contamination based on water quality modeling and estimated that well contamination with PCE from an off-base  began as early as the 1950s. In 2009, the National Research [place] (NRC) released its report Contaminated Water Supplies at [employer]: Assessing Potential Health Effects in response to a request from Congress. That report built on a 2003 [employer] of Medicine (IOM) report that reviewed the toxicologic and epidemiologic literature on solvents that had been used in the [employer] and their potential health effects. The NRC report assessed studies published after the IOM report and focused on the potential health effects of the solvents on [employer] residents and similarly exposed populations. In 2012, Congress passed the [employer] and Caring for [employer] Families Act, also known as the [facility] Act (P.L. 112-154). The act provides health benefits to veterans and family " "members who have any of 15 health conditions.1 Eligible veterans must have served on active duty at [employer] for 30 days or longer between January 1, 1957, and December 31, 1987, and eligible family members must have resided (including being in utero to a mother in residence) at [employer] for 30 days or longer during the same time frame. To assist in the implementation of the act, the [facility] ([facility]) has drafted clinical guidance, including five clinical algorithms, to help health care providers determine whether a  or family member has a medical condition that is covered by the act and whether an episode of care is related to a covered condition.\"    If you have any questions or concerns, please don't hesitate to call.    Sincerely,          LANA Barclay    "

## 2024-05-30 NOTE — ASSESSMENT & PLAN NOTE
6/19/2023  FINDINGS:  Oropharynx:     Normal  Esophagus:      ??Multiple biopsies taken.                           irregular GE junction --possible COMI BARRETTS  Cardia:             A small hiatal hernia was noted  Fundus:           ??polyps                          Sessile Polyp of size 0.4 cm. 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 GASTRIC FUNDIC POLYP (BIOPSY):  -Fundic gland polyp with associated nonspecific patchy mild chronic inflammation.  Histologic features of H. pylon gastritis are not identified  B. ESOPHAGEAL BIOPSY:  Deluna's esophagus with glandular atypia indeterminant for dysplasia.  Nonspecific chronic active inflammation (patchy mild activity) and histologic features consistent with gastroesophageal reflux disease are also noted.  See comment.  RX With omeprazole, 40mg before meal   Repeat EGD in 3 months      IMPRESSIONS :  Diaphragmatic hernia without obstruction or gangrene  Benign neoplasm of stomach     SPECIAL INSTRUCTIONS:  Wait for pathology report      5/30/2024  This is a chronic and stable disease process.   Continue omeprazole   GI referral placed

## 2024-05-30 NOTE — ASSESSMENT & PLAN NOTE
Component  Ref Range & Units 4/26/24  7:12 AM 5/6/23  7:11 AM 5/6/22  7:21 AM 4/20/21  7:22 AM   Cholesterol  See Comment mg/dL 181 184   R, CM   Comment: Cholesterol:        Pediatric <18 Years        Desirable          <170 mg/dL      Borderline High    170-199 mg/dL      High               >=200 mg/dL        Adult >=18 Years           Desirable         <200 mg/dL      Borderline High   200-239 mg/dL      High             >239 mg/dL   Triglycerides  See Comment mg/dL 99 145   R, CM   Comment: Triglyceride:     0-9Y            <75mg/dL     10Y-17Y         <90 mg/dL       >=18Y     Normal          <150 mg/dL     Borderline High 150-199 mg/dL     High            200-499 mg/dL       Very High       >499 mg/dL    Specimen collection should occur prior to Metamizole administration due to the potential for falsely depressed results.   HDL, Direct  >=40 mg/dL 50 47 CM 41 CM 42 CM   LDL Calculated  0 - 100 mg/dL 111 High  108 High  CM 97  High  CM   Comment: LDL Cholesterol:    Optimal           <100 mg/dl    Near Optimal      100-129 mg/dl    Above Optimal      Borderline High 130-159 mg/dl      High            160-189 mg/dl      Very High       >189 mg/dl     Looks good  No need for medication

## 2024-05-31 ENCOUNTER — APPOINTMENT (OUTPATIENT)
Dept: LAB | Facility: CLINIC | Age: 59
End: 2024-05-31
Payer: COMMERCIAL

## 2024-05-31 DIAGNOSIS — T39.1X4D: Chronic | ICD-10-CM

## 2024-05-31 LAB — RHEUMATOID FACT SER QL LA: NEGATIVE

## 2024-05-31 PROCEDURE — 84403 ASSAY OF TOTAL TESTOSTERONE: CPT

## 2024-05-31 PROCEDURE — 82441 TEST FOR CHLOROHYDROCARBONS: CPT

## 2024-05-31 PROCEDURE — 84402 ASSAY OF FREE TESTOSTERONE: CPT

## 2024-05-31 PROCEDURE — 86430 RHEUMATOID FACTOR TEST QUAL: CPT

## 2024-05-31 PROCEDURE — 36415 COLL VENOUS BLD VENIPUNCTURE: CPT

## 2024-05-31 PROCEDURE — 82128 AMINO ACIDS MULT QUAL: CPT

## 2024-06-01 LAB
TESTOST FREE SERPL-MCNC: 4.9 PG/ML (ref 6.6–18.1)
TESTOST SERPL-MCNC: 363 NG/DL (ref 264–916)

## 2024-06-02 PROBLEM — R76.8 HCV ANTIBODY POSITIVE: Status: ACTIVE | Noted: 2024-06-02

## 2024-06-02 PROBLEM — R93.5 ABNORMAL ULTRASOUND OF ABDOMEN: Status: ACTIVE | Noted: 2024-06-02

## 2024-06-02 PROBLEM — R93.7 ABNORMAL X-RAY OF CERVICAL SPINE: Status: ACTIVE | Noted: 2024-06-02

## 2024-06-02 PROBLEM — R93.89 ABNORMAL ULTRASOUND OF PELVIS: Status: ACTIVE | Noted: 2024-06-02

## 2024-06-02 PROBLEM — R93.0 ABNORMAL CT OF THE HEAD: Chronic | Status: ACTIVE | Noted: 2024-06-02

## 2024-06-02 PROBLEM — K22.719 BARRETT'S ESOPHAGUS WITH DYSPLASIA: Status: ACTIVE | Noted: 2024-06-02

## 2024-06-02 PROBLEM — R93.7 ABNORMAL CT SCAN, CERVICAL SPINE: Status: ACTIVE | Noted: 2024-06-02

## 2024-06-02 PROBLEM — K22.719 BARRETT'S ESOPHAGUS WITH DYSPLASIA: Chronic | Status: ACTIVE | Noted: 2024-06-02

## 2024-06-02 PROBLEM — R93.7 ABNORMAL X-RAY OF CERVICAL SPINE: Chronic | Status: ACTIVE | Noted: 2024-06-02

## 2024-06-02 PROBLEM — R76.8 HCV ANTIBODY POSITIVE: Chronic | Status: ACTIVE | Noted: 2024-06-02

## 2024-06-02 PROBLEM — Z98.890 HISTORY OF ESOPHAGOGASTRODUODENOSCOPY (EGD): Chronic | Status: ACTIVE | Noted: 2024-06-02

## 2024-06-02 PROBLEM — R93.5 ABNORMAL ULTRASOUND OF ABDOMEN: Chronic | Status: ACTIVE | Noted: 2024-06-02

## 2024-06-02 PROBLEM — R93.89 ABNORMAL ULTRASOUND OF PELVIS: Chronic | Status: ACTIVE | Noted: 2024-06-02

## 2024-06-02 PROBLEM — R93.7 ABNORMAL X-RAY OF LUMBAR SPINE: Status: ACTIVE | Noted: 2024-06-02

## 2024-06-02 PROBLEM — Z98.890 HISTORY OF ESOPHAGOGASTRODUODENOSCOPY (EGD): Status: ACTIVE | Noted: 2024-06-02

## 2024-06-02 PROBLEM — R10.11 RIGHT UPPER QUADRANT ABDOMINAL PAIN: Chronic | Status: ACTIVE | Noted: 2024-04-24

## 2024-06-02 PROBLEM — R93.7 ABNORMAL X-RAY OF LUMBAR SPINE: Chronic | Status: ACTIVE | Noted: 2024-06-02

## 2024-06-02 PROBLEM — R93.7 ABNORMAL CT SCAN, CERVICAL SPINE: Chronic | Status: ACTIVE | Noted: 2024-06-02

## 2024-06-02 PROBLEM — R19.5 POSITIVE COLORECTAL CANCER SCREENING USING COLOGUARD TEST: Chronic | Status: ACTIVE | Noted: 2023-05-30

## 2024-06-02 PROBLEM — R93.7 ABNORMAL X-RAY OF THORACIC SPINE: Chronic | Status: ACTIVE | Noted: 2024-06-02

## 2024-06-02 PROBLEM — R93.5 ABNORMAL CT OF THE ABDOMEN: Status: ACTIVE | Noted: 2024-06-02

## 2024-06-02 PROBLEM — R93.7 ABNORMAL X-RAY OF THORACIC SPINE: Status: ACTIVE | Noted: 2024-06-02

## 2024-06-02 PROBLEM — R93.5 ABNORMAL CT OF THE ABDOMEN: Chronic | Status: ACTIVE | Noted: 2024-06-02

## 2024-06-02 PROBLEM — R93.0 ABNORMAL CT OF THE HEAD: Status: ACTIVE | Noted: 2024-06-02

## 2024-06-02 NOTE — ASSESSMENT & PLAN NOTE
5/7/2023  FINDINGS:   ALIGNMENT:    There is reversal of normal cervical lordosis.    There is no significant subluxation.    No compression deformity.   VERTEBRAE:    No fracture.   DEGENERATIVE CHANGES:    Mild multilevel cervical degenerative changes are noted without critical central canal stenosis.   PREVERTEBRAL AND PARASPINAL SOFT TISSUES:   Unremarkable   THORACIC INLET:    Normal.   IMPRESSION:   No cervical spine fracture or traumatic malalignment.

## 2024-06-02 NOTE — ASSESSMENT & PLAN NOTE
6/11/2021  FINDINGS:   No fracture.    Mild reversal of the usual cervical lordosis.   Moderate disc height loss at C6-7.   Minimal disc height loss at C5-6 and to a lesser extent C4-5.   Small marginal osteophytes at C5-C7.    Mild bilateral neuroforaminal narrowing at C6-7 left greater than right.   The prevertebral soft tissues are within normal limits.     The lung apices are clear.   IMPRESSION:   No acute osseous abnormality.   Degenerative changes as above.

## 2024-06-02 NOTE — ASSESSMENT & PLAN NOTE
5/7/2023  FINDINGS:   PARENCHYMA:    No intracranial mass, mass effect or midline shift.   No CT signs of acute infarction.    No acute parenchymal hemorrhage.   VENTRICLES AND EXTRA-AXIAL SPACES:    Normal for the patient's age.   VISUALIZED ORBITS:   Normal visualized orbits.   PARANASAL SINUSES:   Normal visualized paranasal sinuses.   CALVARIUM AND EXTRACRANIAL SOFT TISSUES:   Left frontal and periorbital soft tissue swelling with ecchymosis.   Right frontal soft tissue swelling also noted.   IMPRESSION:   No acute intracranial abnormality.    CT Facial Bones  FINDINGS:   FACIAL BONES:     No facial bone fracture identified.    Normal alignment of the temporomandibular joints.    No lytic or blastic lesion.   ORBITS:    Orbital globes, optic nerves, and extraocular muscles appear symmetric and normal.   There is no evidence of retrobulbar mass, abscess, or hematoma.   SINUSES:    Normal.   SOFT TISSUES:   Extensive left periorbital and frontal soft tissue swelling with left-sided probable ecchymosis.   Underlying orbits appear intact.   IMPRESSION:  Posttraumatic soft tissue swelling and left-sided ecchymosis without evidence of facial bone fracture.

## 2024-06-02 NOTE — ASSESSMENT & PLAN NOTE
6/11/2021  FINDINGS:   There is no fracture or pathologic bone lesion.   Mild chronic compression fracture of T12.   Thoracic vertebral alignment is within normal limits.   Multilevel thoracic spondylosis suggestive of diffuse idiopathic skeletal hyperostosis.    There is no displacement of the paraspinal line.    The pedicles appear intact   IMPRESSION:   No acute osseous abnormality.

## 2024-06-02 NOTE — ASSESSMENT & PLAN NOTE
6/19/2023  FINDINGS:  Oropharynx:     Normal  Esophagus:      ??Multiple biopsies taken.                           irregular GE junction --possible COMI BARRETTS  Cardia:             A small hiatal hernia was noted  Fundus:           ??polyps                          Sessile Polyp of size 0.4 cm. 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 GASTRIC FUNDIC POLYP (BIOPSY):  -Fundic gland polyp with associated nonspecific patchy mild chronic inflammation.  Histologic features of H. pylon gastritis are not identified  B. ESOPHAGEAL BIOPSY:  Deluna's esophagus with glandular atypia indeterminant for dysplasia.  Nonspecific chronic active inflammation (patchy mild activity) and histologic features consistent with gastroesophageal reflux disease are also noted.  See comment.  RX With omeprazole, 40mg before meal   Repeat EGD in 3 months     IMPRESSIONS :  Diaphragmatic hernia without obstruction or gangrene  Benign neoplasm of stomach    SPECIAL INSTRUCTIONS:  Wait for pathology report

## 2024-06-02 NOTE — ASSESSMENT & PLAN NOTE
6/11/2021  FINDINGS:   There are 5 non rib bearing lumbar vertebral bodies.   Transitional S1 vertebral body.   Post L5-S1 arthroplasty.   No evidence of hardware loosening.   There is no evidence of acute fracture or destructive osseous lesion.   Alignment is unremarkable.    Small marginal osteophytes at all lumbar levels most prominent at L1 and L2.   Minimal disc height loss asymmetric to the left at L2-3 and L3-4.   Minimal facet degenerative changes at L5-S1.   The pedicles appear intact.   Soft tissues are unremarkable.   IMPRESSION:   No acute osseous abnormality.   Post L5-S1 arthroplasty.   No evidence of hardware loosening.   Degenerative changes as described.

## 2024-06-02 NOTE — ASSESSMENT & PLAN NOTE
9/27/2023  FINDINGS:   Targeted ultrasound in the area of left lower quadrant pain reveals no suspicious solid or cystic mass.   No fluid collection.   Expected shadowing at the site of surgical scar.   No sonographic evidence for hernia.   Normal sonographic appearance of urinary bladder.   IMPRESSION:   Unremarkable examination with no sonographic abnormality to account for the patient's symptoms.

## 2024-06-02 NOTE — ASSESSMENT & PLAN NOTE
5/7/2023  FINDINGS:  CHEST   LUNGS:    Lungs are clear.    There is no tracheal or endobronchial lesion.   PLEURA:    Unremarkable.   HEART/GREAT VESSELS:   Heart is unremarkable for patient's age.    No thoracic aortic aneurysm.   MEDIASTINUM AND RIN:    Unremarkable.   CHEST WALL AND LOWER NECK:    Unremarkable.   ABDOMEN   LIVER/BILIARY TREE:   Enlarged fatty liver noted.   GALLBLADDER:    No calcified gallstones.   No pericholecystic inflammatory change.   SPLEEN:    Unremarkable.   PANCREAS:    Unremarkable.   ADRENAL GLANDS:    Unremarkable.   KIDNEYS/URETERS:    Unremarkable.   No hydronephrosis.   STOMACH AND BOWEL:   Diverticular disease without diverticulitis.   Small sliding-type hiatal hernia noted.   APPENDIX:    No findings to suggest appendicitis.   ABDOMINOPELVIC CAVITY:    No ascites.    No pneumoperitoneum.    No lymphadenopathy.   VESSELS:    Unremarkable for patient's age.   PELVIS   REPRODUCTIVE ORGANS:    Unremarkable for patient's age.   URINARY BLADDER:    Unremarkable.   ABDOMINAL WALL/INGUINAL REGIONS:    Unremarkable.   OSSEOUS STRUCTURES:   L5-S1 arthroplasty level noted   IMPRESSION:  No evidence of acute posttraumatic injury throughout the chest, abdomen or pelvis.

## 2024-06-02 NOTE — ASSESSMENT & PLAN NOTE
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

## 2024-06-05 ENCOUNTER — TELEPHONE (OUTPATIENT)
Age: 59
End: 2024-06-05

## 2024-06-05 LAB
A-AMINOBUTYR SERPL-SCNC: 51.5 UMOL/L (ref 5.4–34.5)
AAA SERPL-SCNC: 2 UMOL/L (ref 0–1.9)
ALANINE SERPL-SCNC: 361.7 UMOL/L (ref 209.2–515.5)
ALLOISOLEUCINE SERPL-SCNC: 3.6 UMOL/L (ref 0–3.2)
AMINO ACID PAT SERPL-IMP: ABNORMAL
ARGININE SERPL-SCNC: 83.5 UMOL/L (ref 36.3–119.2)
ARGININOSUCCINATE SERPL-SCNC: <0.1 UMOL/L (ref 0–3)
ASPARAGINE SERPL-SCNC: 71.7 UMOL/L (ref 29.5–84.5)
ASPARTATE SERPL-SCNC: 2.8 UMOL/L (ref 0–7.4)
B-AIB SERPL-SCNC: 2.7 UMOL/L (ref 0–4.3)
B-ALANINE SERPL-SCNC: 4 UMOL/L (ref 1.1–9)
CITRULLINE SERPL-SCNC: 22.4 UMOL/L (ref 15.6–46.9)
CYSTATHIONIN SERPL-SCNC: 1.2 UMOL/L (ref 0–0.7)
CYSTINE SERPL-SCNC: 39.5 UMOL/L (ref 15.8–47.3)
GABA SERPL-SCNC: <0.5 UMOL/L (ref 0–0.6)
GLUTAMATE SERPL-SCNC: 170.7 UMOL/L (ref 18.1–155.9)
GLUTAMINE SERPL-SCNC: 517.8 UMOL/L (ref 372.8–701.4)
GLYCINE SERPL-SCNC: 177.1 UMOL/L (ref 144–411)
HCYS SERPL-SCNC: <0.3 UMOL/L (ref 0–0.2)
HISTIDINE SERPL-SCNC: 59.3 UMOL/L (ref 47.2–98.5)
HOMOCITRULLINE SERPL-SCNC: 0.6 UMOL/L (ref 0–1.7)
ISOLEUCINE SERPL-SCNC: 68.6 UMOL/L (ref 32.8–88.3)
LAB DIRECTOR NAME PROVIDER: ABNORMAL
LEUCINE SERPL-SCNC: 150 UMOL/L (ref 66.7–165.7)
LYSINE SERPL-SCNC: 201.9 UMOL/L (ref 94–278)
METHIONINE SERPL-SCNC: 29.8 UMOL/L (ref 14.7–35.2)
OH-LYSINE SERPL-SCNC: 0.2 UMOL/L (ref 0.1–0.8)
OH-PROLINE SERPL-SCNC: 8.1 UMOL/L (ref 4.7–35.2)
ORNITHINE SERPL-SCNC: 74.2 UMOL/L (ref 30.1–101.3)
PHE SERPL-SCNC: 70.4 UMOL/L (ref 35.8–76.9)
PROLINE SERPL-SCNC: 148.7 UMOL/L (ref 84.8–352.5)
REF LAB TEST METHOD: ABNORMAL
SARCOSINE SERPL-SCNC: 1.1 UMOL/L (ref 0–4)
SERINE SERPL-SCNC: 133.2 UMOL/L (ref 48.7–145.2)
TAURINE SERPL-SCNC: 68.1 UMOL/L (ref 29.2–132.3)
THREONINE SERPL-SCNC: 246.3 UMOL/L (ref 67.8–211.6)
TRYPTOPHAN SERPL-SCNC: 78.2 UMOL/L (ref 23.5–93)
TYROSINE SERPL-SCNC: 81.9 UMOL/L (ref 27.8–83.3)
VALINE SERPL-SCNC: 277.7 UMOL/L (ref 133–317.1)

## 2024-06-05 NOTE — TELEPHONE ENCOUNTER
Verónica from [facility] called to let the provider know the test for perfluoroalkyl is no longer available.  They cannot order the test to send it, no one has it.   Any questions, please call Verónica at [phone].

## 2024-06-11 ENCOUNTER — TELEPHONE (OUTPATIENT)
Age: 59
End: 2024-06-11

## 2024-06-11 LAB — MISCELLANEOUS LAB TEST RESULT: NORMAL

## 2024-06-19 LAB — MISCELLANEOUS LAB TEST RESULT: NORMAL

## 2024-06-29 PROBLEM — Z12.5 SCREENING FOR PROSTATE CANCER: Status: RESOLVED | Noted: 2023-03-06 | Resolved: 2024-06-29

## 2024-06-29 PROBLEM — Z13.1 SCREENING FOR DIABETES MELLITUS: Status: RESOLVED | Noted: 2022-05-04 | Resolved: 2024-06-29

## 2024-06-29 PROBLEM — Z13.220 SCREENING FOR HYPERLIPIDEMIA: Status: RESOLVED | Noted: 2022-05-04 | Resolved: 2024-06-29

## 2024-06-29 PROBLEM — Z00.00 MEDICARE ANNUAL WELLNESS VISIT, SUBSEQUENT: Status: RESOLVED | Noted: 2021-03-23 | Resolved: 2024-06-29

## 2024-07-15 ENCOUNTER — TELEPHONE (OUTPATIENT)
Dept: INTERNAL MEDICINE CLINIC | Facility: CLINIC | Age: 59
End: 2024-07-15

## 2024-07-18 DIAGNOSIS — T39.1X4D: Primary | Chronic | ICD-10-CM

## 2024-07-26 ENCOUNTER — TELEPHONE (OUTPATIENT)
Dept: INTERNAL MEDICINE CLINIC | Facility: CLINIC | Age: 59
End: 2024-07-26

## 2024-07-26 NOTE — TELEPHONE ENCOUNTER
Krissy from [facility] called stating that they can't fine the test for the mesomark and the organic solvents. Is something else able to be order in replacement?

## 2024-08-07 ENCOUNTER — PROCEDURE VISIT (OUTPATIENT)
Dept: UROLOGY | Facility: AMBULATORY SURGERY CENTER | Age: 59
End: 2024-08-07
Payer: COMMERCIAL

## 2024-08-07 VITALS
SYSTOLIC BLOOD PRESSURE: 140 MMHG | DIASTOLIC BLOOD PRESSURE: 80 MMHG | BODY MASS INDEX: 33.04 KG/M2 | WEIGHT: 236 LBS | HEIGHT: 71 IN | OXYGEN SATURATION: 97 % | HEART RATE: 92 BPM

## 2024-08-07 DIAGNOSIS — N99.111 POSTPROCEDURAL BULBOUS URETHRAL STRICTURE: ICD-10-CM

## 2024-08-07 DIAGNOSIS — R31.0 GROSS HEMATURIA: Primary | ICD-10-CM

## 2024-08-07 LAB
SL AMB  POCT GLUCOSE, UA: NORMAL
SL AMB LEUKOCYTE ESTERASE,UA: NORMAL
SL AMB POCT BILIRUBIN,UA: NORMAL
SL AMB POCT BLOOD,UA: NORMAL
SL AMB POCT CLARITY,UA: CLEAR
SL AMB POCT COLOR,UA: NORMAL
SL AMB POCT KETONES,UA: NORMAL
SL AMB POCT NITRITE,UA: NORMAL
SL AMB POCT PH,UA: 6
SL AMB POCT SPECIFIC GRAVITY,UA: 1.01
SL AMB POCT URINE PROTEIN: NORMAL
SL AMB POCT UROBILINOGEN: 0.2

## 2024-08-07 PROCEDURE — 81002 URINALYSIS NONAUTO W/O SCOPE: CPT | Performed by: UROLOGY

## 2024-08-07 PROCEDURE — 99214 OFFICE O/P EST MOD 30 MIN: CPT | Performed by: UROLOGY

## 2024-08-07 RX ORDER — CEFAZOLIN SODIUM 2 G/50ML
2000 SOLUTION INTRAVENOUS ONCE
OUTPATIENT
Start: 2024-08-07 | End: 2024-08-07

## 2024-08-07 RX ORDER — SODIUM CHLORIDE 9 MG/ML
125 INJECTION, SOLUTION INTRAVENOUS CONTINUOUS
OUTPATIENT
Start: 2024-08-07

## 2024-08-07 NOTE — PROGRESS NOTES
8/7/2024    Donnie Amato  1965  [id number]    1. Gross hematuria  -     POCT urine dip  2. Postprocedural bulbous urethral stricture  Assessment & Plan:  Impression: 1 cm bulbar urethral stricture    Plan: Rather than performing cystoscopy with dilation in the office, I am recommending cystoscopy with cold knife urethrotomy/DVIU in the operating room followed by the Optilume paclitaxel balloon.  Risk of the procedure including but not limited to bleeding, infection, perforation, and stricture recurrence were discussed.  Informed consent obtained.  The patient wishes to proceed.  Orders:  -     Case request operating room: DIRECT VISUAL INTERAL URETHROTOMY (DVIU), with Optilume paclitaxel balloon; Standing  -     Case request operating room: DIRECT VISUAL INTERAL URETHROTOMY (DVIU), with Optilume paclitaxel balloon       History of Present Illness  59 y.o. male with a history of urethral stricture disease dating back almost 15 years ago.  He was dilated in the office by Dr. Carrasco.  In May 2024 I attempted office based cystoscopy as he was complaining of a slight weakening of his urinary stream.  I found a stricture that appeared to be approximately 1 cm in length that I was unable to traverse with the cystoscope.  At that time I had recommended cold knife urethrotomy in the operating room.  The patient had requested office dilation but had a lengthy discussion with him in the office today explaining the procedure that I recommend in the operating room.  His stream is somewhat weak and he does for the most part empty to completion.  PVR in the office today approximately 100 cc.      AUA Symptom Score      Review of Systems   Constitutional: Negative.    HENT: Negative.     Eyes: Negative.    Respiratory: Negative.     Cardiovascular: Negative.    Gastrointestinal: Negative.    Endocrine: Negative.    Genitourinary:         Per HPI   Musculoskeletal: Negative.    Skin: Negative.    Allergic/Immunologic:  Negative.    Neurological: Negative.    Hematological: Negative.    Psychiatric/Behavioral: Negative.         Past Medical History  Past Medical History:   Diagnosis Date   • Arthritis     last assessed 17    • BPH without urinary obstruction     last assessed 17   • Erectile dysfunction     last assessed 17    • Medicare annual wellness visit, subsequent 2021   • Urethral stricture     last assessed 17    • Weak urinary stream     last assessed 17        Past Social History  Past Surgical History:   Procedure Laterality Date   • BACK SURGERY      last assessed 17    • CYSTOSCOPY      Bladder, last assessed 17    • FOOT SURGERY     • KNEE SURGERY      last assessed 17    • URETHRAL DILATION      last assessed 17        Past Family History  Family History   Problem Relation Age of Onset   • Diabetes Mother    • Heart disease Father         cardiac disorder    • Stroke Father         CVA       Past Social history  Social History     Socioeconomic History   • Marital status:      Spouse name: Not on file   • Number of children: Not on file   • Years of education: Not on file   • Highest education level: Not on file   Occupational History   • Occupation: retired   Tobacco Use   • Smoking status: Former     Current packs/day: 0.00     Types: Cigarettes     Quit date:      Years since quittin.6     Passive exposure: Never   • Smokeless tobacco: Never   • Tobacco comments:     Never a smoker   Vaping Use   • Vaping status: Never Used   Substance and Sexual Activity   • Alcohol use: Yes     Comment: Social, rarely   • Drug use: Not Currently   • Sexual activity: Yes     Partners: Female   Other Topics Concern   • Not on file   Social History Narrative     per [employer]    No caffeine use      Social Determinants of Health     Financial Resource Strain: Patient Declined (2023)    Overall Financial Resource Strain (CARDIA)    •  "Difficulty of Paying Living Expenses: Patient declined   Food Insecurity: No Food Insecurity (5/30/2024)    Hunger Vital Sign    • Worried About Running Out of Food in the Last Year: Never true    • Ran Out of Food in the Last Year: Never true   Transportation Needs: No Transportation Needs (5/30/2024)    PRAPARE - Transportation    • Lack of Transportation (Medical): No    • Lack of Transportation (Non-Medical): No   Physical Activity: Not on file   Stress: Not on file   Social Connections: Not on file   Intimate Partner Violence: Not on file   Housing Stability: Low Risk  (5/30/2024)    Housing Stability Vital Sign    • Unable to Pay for Housing in the Last Year: No    • Number of Times Moved in the Last Year: 0    • Homeless in the Last Year: No       Current Medications  Current Outpatient Medications   Medication Sig Dispense Refill   • cholecalciferol (VITAMIN D3) 1,000 units tablet Take 2 tablets (2,000 Units total) by mouth daily 180 tablet 3   • ibuprofen (MOTRIN) 600 mg tablet Take 1 tablet (600 mg total) by mouth every 6 (six) hours as needed for mild pain 120 tablet 0   • omeprazole (PriLOSEC) 40 MG capsule Take 40 mg by mouth daily as needed     • penciclovir (DENAVIR) 1 % cream Apply topically every 2 (two) hours 1.5 g 0   • POTASSIUM CHLORIDE ER PO Take by mouth     • vitamin B-12 (VITAMIN B-12) 500 mcg tablet Take 1 tablet (500 mcg total) by mouth daily 90 tablet 3   • zinc sulfate (ZINCATE) 220 mg capsule Take 220 mg by mouth daily       No current facility-administered medications for this visit.       Allergies  No Known Allergies    Past Medical History, Social History, Family History, medications and allergies were reviewed.    Vitals  Vitals:    08/07/24 1402   BP: 140/80   BP Location: Left arm   Patient Position: Sitting   Cuff Size: Standard   Pulse: 92   SpO2: 97%   Weight: 107 kg (236 lb)   Height: 5' 11\" (1.803 m)       Physical Exam  On examination he is in no acute distress.  His " abdomen is soft nontender nondistended.  A low midline scar is noted.  Results  Lab Results   Component Value Date    PSA 0.94 04/26/2024    PSA 0.8 05/06/2023     Lab Results   Component Value Date    CALCIUM 9.6 04/26/2024    K 3.9 04/26/2024    CO2 26 04/26/2024     04/26/2024    BUN 16 04/26/2024    CREATININE 0.84 04/26/2024     Lab Results   Component Value Date    WBC 6.98 04/26/2024    HGB 15.2 04/26/2024    HCT 45.5 04/26/2024    MCV 90 04/26/2024     04/26/2024       Office Urine Dip  Recent Results (from the past 1 hour(s))   POCT urine dip    Collection Time: 08/07/24  2:04 PM   Result Value Ref Range    LEUKOCYTE ESTERASE,UA -     NITRITE,UA -     SL AMB POCT UROBILINOGEN 0.2     POCT URINE PROTEIN -      PH,UA 6.0     BLOOD,UA -     SPECIFIC GRAVITY,UA 1.010     KETONES,UA -     BILIRUBIN,UA -     GLUCOSE, UA -      COLOR,UA straw     CLARITY,UA clear    ]

## 2024-08-07 NOTE — ASSESSMENT & PLAN NOTE
Impression: 1 cm bulbar urethral stricture    Plan: Rather than performing cystoscopy with dilation in the office, I am recommending cystoscopy with cold knife urethrotomy/DVIU in the operating room followed by the Optilume paclitaxel balloon.  Risk of the procedure including but not limited to bleeding, infection, perforation, and stricture recurrence were discussed.  Informed consent obtained.  The patient wishes to proceed.

## 2024-08-09 LAB
CREAT UR-MCNC: 2424 MG/L
HIPPURATE [MASS/VOLUME] IN URINE: 1.1 G/L
HIPPURATE/CREATININE [MASS RATIO] IN URINE: 0.47 G/G CREAT
LEAD BLD-MCNC: <1 MCG/DL
Lab: 0.1 NG/ML
Lab: 0.36 NG/ML
Lab: 0.91 NG/ML
Lab: 1.5 NG/ML
Lab: 1.8 NG/ML
Lab: 1.9 NG/ML
Lab: 1.9 NG/ML
Lab: 2.4 NG/ML
Lab: 37 MG/L
Lab: NORMAL G/G CREAT
Lab: NORMAL G/G CREAT
Lab: NORMAL MCG/G CREAT
Lab: NORMAL MCG/L
Lab: NORMAL NG/ML
Lab: NORMAL NG/ML
MANDELATE [MASS/VOLUME] IN URINE: NORMAL G/L
META METHYLHIPPURATE+PARA METHYLHIPPURATE [MASS/VOLUME] IN URINE: NORMAL G/L
METHANOL BLD-MCNC: NORMAL MG/DL
METHYLHIPPURATE [MASS/VOLUME] IN URINE: NORMAL G/L
METHYLHIPPURATE [MASS/VOLUME] IN URINE: NORMAL G/L
METHYLHIPPURATE/CREATININE [MASS RATIO] IN URINE: NORMAL G/G CREAT
METHYLHIPPURATE/CREATININE [MASS RATIO] IN URINE: NORMAL G/G CREAT
ORTHOCRESOL [MASS/VOLUME] IN URINE: NORMAL MG/L
ORTHOCRESOL/CREATININE [MASS RATIO] IN URINE: NORMAL MG/G CREAT
PHENOL [MASS/VOLUME] IN URINE: 11 MG/L
PHENOL/CREATININE [MASS RATIO] IN URINE: 4.5 MG/G CREAT
PHENYLGLYOXYLATE [MASS/VOLUME] IN URINE: NORMAL G/L
PHENYLGLYOXYLATE/CREATININE [MASS RATIO] IN URINE: NORMAL G/G CREAT
SPECIMEN SOURCE: NORMAL
TRICHLOROETHYLENE [MASS/VOLUME] IN BLOOD: NORMAL MCG/ML
VMA/CREAT UR: NORMAL G/G CREAT

## 2024-08-27 ENCOUNTER — RA CDI HCC (OUTPATIENT)
Dept: OTHER | Facility: HOSPITAL | Age: 59
End: 2024-08-27
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
## 2024-08-27 PROBLEM — M25.561 ACUTE PAIN OF BOTH KNEES: Status: RESOLVED | Noted: 2021-06-23 | Resolved: 2024-08-27

## 2024-08-27 PROBLEM — M25.562 ACUTE PAIN OF BOTH KNEES: Status: RESOLVED | Noted: 2021-06-23 | Resolved: 2024-08-27

## 2024-08-27 PROBLEM — Z00.00 ENCOUNTER FOR PHYSICAL EXAMINATION: Status: RESOLVED | Noted: 2023-03-06 | Resolved: 2024-08-27

## 2024-08-27 PROBLEM — L03.90 CELLULITIS OF SKIN: Status: RESOLVED | Noted: 2017-11-26 | Resolved: 2024-08-27

## 2024-08-27 PROBLEM — S20.461A TICK BITE OF RIGHT BACK WALL OF THORAX: Status: RESOLVED | Noted: 2023-05-30 | Resolved: 2024-08-27

## 2024-09-02 PROBLEM — Z00.00 MEDICARE ANNUAL WELLNESS VISIT, SUBSEQUENT: Chronic | Status: ACTIVE | Noted: 2021-03-23

## 2024-09-02 PROBLEM — Z13.220 SCREENING FOR HYPERLIPIDEMIA: Chronic | Status: ACTIVE | Noted: 2022-05-04

## 2024-09-02 PROBLEM — Z13.1 SCREENING FOR DIABETES MELLITUS: Chronic | Status: ACTIVE | Noted: 2022-05-04

## 2024-09-02 NOTE — ASSESSMENT & PLAN NOTE
This is a chronic and unstable disease process.   Continue supplement but increase to 2000 units daily

## 2024-09-02 NOTE — ASSESSMENT & PLAN NOTE
This is a chronic and stable disease process.   Patient would benefit from B12 supplement  Will monitor labs

## 2024-09-02 NOTE — PROGRESS NOTES
Ambulatory Visit  Name: Donnie Amato      : 1965      MRN: [id number]  Encounter Provider: LANA Barclay  Encounter Date: 2024   Encounter department: [facility]    Assessment & Plan   1. Gastroesophageal reflux disease without esophagitis  Assessment & Plan:  This is a chronic and stable disease process.   Continue   Omeprazole  2. Deluna's esophagus with dysplasia  Assessment & Plan:  This is a chronic and stable disease process.   Continue   Omeprazole    3. Poisoning by 4-aminophenol derivatives, undetermined intent, subsequent encounter  4. Class 1 obesity due to excess calories without serious comorbidity with body mass index (BMI) of 31.0 to 31.9 in adult  Assessment & Plan:  This is a chronic and stable disease process.   Lifestyle modification, diet and exercise discussed  5. Vitamin D deficiency  Assessment & Plan:  This is a chronic and unstable disease process.   Continue supplement but increase to 2000 units daily   6. Vitamin B12 deficiency  Assessment & Plan:  This is a chronic and stable disease process.   Patient would benefit from B12 supplement  Will monitor labs   7. Encounter for immunization  -     Pneumococcal Conjugate Vaccine 20-valent (Pcv20)  -     HEPATITIS A VACCINE ADULT IM  -     HEPATITIS B VACCINE ADULT IM  8. Erectile dysfunction due to diseases classified elsewhere  9. Screening for prostate cancer  10. Screening for hyperlipidemia  11. Screening for diabetes mellitus       History of Present Illness     The patient is here today to discuss his medications and treatment plans.   I reviewed their medical conditions, medications, laboratory and radiology studies.  I reviewed their scheduled future lab studies with the patient.   The patient's current treatment plan is effective.   There is no change in the current therapy.   I ask the patient to continue their current therapy.   The patient voiced their understanding and agreement.  "  Follow up in 3 months .  Please continue to the PORCH section of the note for details of today's visit.               Review of Systems   Constitutional:  Negative for activity change, chills, fatigue and fever.   HENT:  Negative for rhinorrhea and sore throat.    Eyes:  Negative for pain.   Respiratory:  Negative for cough and shortness of breath.    Cardiovascular:  Negative for chest pain, palpitations and leg swelling.   Gastrointestinal:  Negative for abdominal pain, constipation, diarrhea, nausea and vomiting.   Genitourinary:  Negative for difficulty urinating, flank pain, frequency and urgency.   Musculoskeletal:  Negative for gait problem, joint swelling and myalgias.   Skin:  Negative for color change.   Neurological:  Negative for dizziness, weakness, light-headedness and headaches.   Psychiatric/Behavioral:  Negative for sleep disturbance. The patient is not nervous/anxious.    All other systems reviewed and are negative.    Current Outpatient Medications on File Prior to Visit   Medication Sig Dispense Refill   • cholecalciferol (VITAMIN D3) 1,000 units tablet Take 2 tablets (2,000 Units total) by mouth daily 180 tablet 3   • ibuprofen (MOTRIN) 600 mg tablet Take 1 tablet (600 mg total) by mouth every 6 (six) hours as needed for mild pain 120 tablet 0   • omeprazole (PriLOSEC) 40 MG capsule Take 40 mg by mouth daily as needed     • penciclovir (DENAVIR) 1 % cream Apply topically every 2 (two) hours 1.5 g 0   • POTASSIUM CHLORIDE ER PO Take by mouth     • vitamin B-12 (VITAMIN B-12) 500 mcg tablet Take 1 tablet (500 mcg total) by mouth daily 90 tablet 3   • zinc sulfate (ZINCATE) 220 mg capsule Take 220 mg by mouth daily       No current facility-administered medications on file prior to visit.      Objective     /74   Pulse 61   Temp 97.7 °F (36.5 °C) (Tympanic)   Ht 5' 11\" (1.803 m)   Wt 102 kg (223 lb 12.8 oz)   SpO2 98%   BMI 31.21 kg/m²     Physical Exam  Vitals and nursing note " reviewed.   Constitutional:       General: He is awake.      Appearance: Normal appearance. He is well-developed and overweight.   HENT:      Head: Normocephalic and atraumatic.      Nose: Nose normal.      Mouth/Throat:      Mouth: Mucous membranes are moist.   Eyes:      Conjunctiva/sclera: Conjunctivae normal.   Cardiovascular:      Rate and Rhythm: Normal rate and regular rhythm.      Pulses: Normal pulses.      Heart sounds: Normal heart sounds. No murmur heard.  Pulmonary:      Effort: Pulmonary effort is normal. No respiratory distress.      Breath sounds: Normal breath sounds.   Abdominal:      General: Bowel sounds are normal.      Palpations: Abdomen is soft.      Tenderness: There is no abdominal tenderness.   Genitourinary:     Comments: ED issues   Musculoskeletal:      Cervical back: Neck supple.      Right lower leg: No edema.      Left lower leg: No edema.   Skin:     General: Skin is warm and dry.   Neurological:      Mental Status: He is alert and oriented to person, place, and time.      Motor: Motor function is intact.      Coordination: Coordination is intact.      Gait: Gait is intact.   Psychiatric:         Attention and Perception: Attention normal.         Mood and Affect: Mood normal.         Speech: Speech normal.         Behavior: Behavior normal. Behavior is cooperative.         Thought Content: Thought content normal.         Cognition and Memory: Cognition normal.         Judgment: Judgment normal.       Administrative Statements   I have spent a total time of 35 minutes in caring for this patient on the day of the visit/encounter including Diagnostic results, Prognosis, Risks and benefits of tx options, Instructions for management, Patient and family education, Importance of tx compliance, Risk factor reductions, Impressions, Counseling / Coordination of care, Documenting in the medical record, Reviewing / ordering tests, medicine, procedures  , and Obtaining or reviewing history  .

## 2024-09-04 ENCOUNTER — OFFICE VISIT (OUTPATIENT)
Dept: FAMILY MEDICINE CLINIC | Facility: CLINIC | Age: 59
End: 2024-09-04
Payer: COMMERCIAL

## 2024-09-04 VITALS
SYSTOLIC BLOOD PRESSURE: 108 MMHG | WEIGHT: 223.8 LBS | BODY MASS INDEX: 31.33 KG/M2 | TEMPERATURE: 97.7 F | HEART RATE: 61 BPM | HEIGHT: 71 IN | OXYGEN SATURATION: 98 % | DIASTOLIC BLOOD PRESSURE: 74 MMHG

## 2024-09-04 DIAGNOSIS — Z12.5 SCREENING FOR PROSTATE CANCER: Chronic | ICD-10-CM

## 2024-09-04 DIAGNOSIS — K21.9 GASTROESOPHAGEAL REFLUX DISEASE WITHOUT ESOPHAGITIS: Primary | Chronic | ICD-10-CM

## 2024-09-04 DIAGNOSIS — E53.8 VITAMIN B12 DEFICIENCY: Chronic | ICD-10-CM

## 2024-09-04 DIAGNOSIS — Z23 ENCOUNTER FOR IMMUNIZATION: ICD-10-CM

## 2024-09-04 DIAGNOSIS — Z13.220 SCREENING FOR HYPERLIPIDEMIA: Chronic | ICD-10-CM

## 2024-09-04 DIAGNOSIS — Z13.1 SCREENING FOR DIABETES MELLITUS: Chronic | ICD-10-CM

## 2024-09-04 DIAGNOSIS — E55.9 VITAMIN D DEFICIENCY: Chronic | ICD-10-CM

## 2024-09-04 DIAGNOSIS — E66.09 CLASS 1 OBESITY DUE TO EXCESS CALORIES WITHOUT SERIOUS COMORBIDITY WITH BODY MASS INDEX (BMI) OF 31.0 TO 31.9 IN ADULT: Chronic | ICD-10-CM

## 2024-09-04 DIAGNOSIS — N52.1 ERECTILE DYSFUNCTION DUE TO DISEASES CLASSIFIED ELSEWHERE: ICD-10-CM

## 2024-09-04 DIAGNOSIS — T39.1X4D: Chronic | ICD-10-CM

## 2024-09-04 DIAGNOSIS — K22.719 BARRETT'S ESOPHAGUS WITH DYSPLASIA: Chronic | ICD-10-CM

## 2024-09-04 PROBLEM — Z98.890 HISTORY OF COLONOSCOPY: Chronic | Status: ACTIVE | Noted: 2023-05-30

## 2024-09-04 PROBLEM — T26.40XA: Chronic | Status: ACTIVE | Noted: 2024-09-04

## 2024-09-04 PROBLEM — R63.8: Chronic | Status: ACTIVE | Noted: 2023-05-30

## 2024-09-04 PROBLEM — T26.40XA: Status: ACTIVE | Noted: 2024-09-04

## 2024-09-04 PROCEDURE — G0009 ADMIN PNEUMOCOCCAL VACCINE: HCPCS

## 2024-09-04 PROCEDURE — 99214 OFFICE O/P EST MOD 30 MIN: CPT | Performed by: NURSE PRACTITIONER

## 2024-09-04 PROCEDURE — 90677 PCV20 VACCINE IM: CPT

## 2024-12-04 ENCOUNTER — OFFICE VISIT (OUTPATIENT)
Dept: FAMILY MEDICINE CLINIC | Facility: CLINIC | Age: 59
End: 2024-12-04
Payer: COMMERCIAL

## 2024-12-04 VITALS
DIASTOLIC BLOOD PRESSURE: 82 MMHG | TEMPERATURE: 97.3 F | HEIGHT: 71 IN | BODY MASS INDEX: 33.35 KG/M2 | OXYGEN SATURATION: 94 % | SYSTOLIC BLOOD PRESSURE: 120 MMHG | HEART RATE: 65 BPM | WEIGHT: 238.2 LBS | RESPIRATION RATE: 15 BRPM

## 2024-12-04 DIAGNOSIS — K21.9 GASTROESOPHAGEAL REFLUX DISEASE WITHOUT ESOPHAGITIS: Primary | Chronic | ICD-10-CM

## 2024-12-04 DIAGNOSIS — K76.0 HEPATIC STEATOSIS: Chronic | ICD-10-CM

## 2024-12-04 DIAGNOSIS — E55.9 VITAMIN D DEFICIENCY: Chronic | ICD-10-CM

## 2024-12-04 DIAGNOSIS — Z23 ENCOUNTER FOR IMMUNIZATION: ICD-10-CM

## 2024-12-04 DIAGNOSIS — E66.811 CLASS 1 OBESITY DUE TO EXCESS CALORIES WITHOUT SERIOUS COMORBIDITY WITH BODY MASS INDEX (BMI) OF 31.0 TO 31.9 IN ADULT: Chronic | ICD-10-CM

## 2024-12-04 DIAGNOSIS — Z87.898 HISTORY OF GROSS HEMATURIA: Chronic | ICD-10-CM

## 2024-12-04 DIAGNOSIS — K22.719 BARRETT'S ESOPHAGUS WITH DYSPLASIA: Chronic | ICD-10-CM

## 2024-12-04 DIAGNOSIS — T39.1X4D: Chronic | ICD-10-CM

## 2024-12-04 DIAGNOSIS — E53.8 VITAMIN B12 DEFICIENCY: Chronic | ICD-10-CM

## 2024-12-04 DIAGNOSIS — E66.09 CLASS 1 OBESITY DUE TO EXCESS CALORIES WITHOUT SERIOUS COMORBIDITY WITH BODY MASS INDEX (BMI) OF 31.0 TO 31.9 IN ADULT: Chronic | ICD-10-CM

## 2024-12-04 PROBLEM — K42.9 UMBILICAL HERNIA WITHOUT OBSTRUCTION AND WITHOUT GANGRENE: Chronic | Status: ACTIVE | Noted: 2023-10-20

## 2024-12-04 PROCEDURE — 90673 RIV3 VACCINE NO PRESERV IM: CPT | Performed by: NURSE PRACTITIONER

## 2024-12-04 PROCEDURE — 90471 IMMUNIZATION ADMIN: CPT

## 2024-12-04 PROCEDURE — 99214 OFFICE O/P EST MOD 30 MIN: CPT | Performed by: NURSE PRACTITIONER

## 2024-12-04 PROCEDURE — 90673 RIV3 VACCINE NO PRESERV IM: CPT

## 2024-12-04 PROCEDURE — G0008 ADMIN INFLUENZA VIRUS VAC: HCPCS | Performed by: NURSE PRACTITIONER

## 2024-12-04 NOTE — ASSESSMENT & PLAN NOTE
This is a chronic disease process.   The patient is stable at this time.  We discussed diet and exercise.  Will monitor labs.  Continue  Vitamin supplement

## 2024-12-04 NOTE — PROGRESS NOTES
Name: Donnie Amato      : 1965      MRN: [id number]  Encounter Provider: LANA Barclay  Encounter Date: 2024   Encounter department: [facility]  :  Assessment & Plan  Encounter for immunization    Orders:    influenza vaccine, recombinant, PF, 0.5 mL IM (Flublok)    Deluna's esophagus with dysplasia    Orders:    Ambulatory Referral to Gastroenterology; Future    Gastroesophageal reflux disease without esophagitis  This is a chronic disease process.   The patient is stable at this time.  We discussed diet and exercise.  Continue  Omeprazole    Orders:    Ambulatory Referral to Gastroenterology; Future    Hepatic steatosis    Orders:    Ambulatory Referral to Gastroenterology; Future    US abdomen complete; Future    Poisoning by 4-aminophenol derivatives, undetermined intent, subsequent encounter    Orders:    Ambulatory Referral to Gastroenterology; Future    US abdomen complete; Future    Class 1 obesity due to excess calories without serious comorbidity with body mass index (BMI) of 31.0 to 31.9 in adult  Lifestyle modification, diet and exercise discussed         Vitamin B12 deficiency  This is a chronic disease process.   The patient is stable at this time.  We discussed diet and exercise.  Will monitor labs.  Continue  Vitamin supplement          Vitamin D deficiency  This is a chronic disease process.   The patient is stable at this time.  We discussed diet and exercise.  Will monitor labs.  Continue  Vitamin supplement          History of gross hematuria  Has an appointment with Dr. Rey on 12/10/2024                History of Present Illness     The patient is here today to discuss his medications and treatment plans.   I reviewed their medical conditions, medications, laboratory and radiology studies.  I reviewed their scheduled future lab studies with the patient.   The patient's current treatment plan is effective.   There is no change in the  current therapy.   I ask the patient to continue their current therapy.   The patient voiced their understanding and agreement.   Follow up in May for his Medicare annual physical.  Please continue to the PORCH section of the note for details of today's visit.             Review of Systems   Constitutional:  Negative for activity change, chills, fatigue and fever.   HENT:  Negative for rhinorrhea and sore throat.    Eyes:  Negative for pain.   Respiratory:  Negative for cough and shortness of breath.    Cardiovascular:  Negative for chest pain, palpitations and leg swelling.   Gastrointestinal:  Negative for abdominal pain, constipation, diarrhea, nausea and vomiting.   Genitourinary:  Negative for difficulty urinating, flank pain, frequency and urgency.   Musculoskeletal:  Negative for gait problem, joint swelling and myalgias.   Skin:  Negative for color change.   Neurological:  Negative for dizziness, weakness, light-headedness and headaches.   Psychiatric/Behavioral:  Negative for sleep disturbance. The patient is not nervous/anxious.    All other systems reviewed and are negative.    Current Outpatient Medications on File Prior to Visit   Medication Sig Dispense Refill    cholecalciferol (VITAMIN D3) 1,000 units tablet Take 2 tablets (2,000 Units total) by mouth daily 180 tablet 3    ibuprofen (MOTRIN) 600 mg tablet Take 1 tablet (600 mg total) by mouth every 6 (six) hours as needed for mild pain 120 tablet 0    omeprazole (PriLOSEC) 40 MG capsule Take 40 mg by mouth daily as needed      penciclovir (DENAVIR) 1 % cream Apply topically every 2 (two) hours 1.5 g 0    POTASSIUM CHLORIDE ER PO Take by mouth      vitamin B-12 (VITAMIN B-12) 500 mcg tablet Take 1 tablet (500 mcg total) by mouth daily 90 tablet 3    zinc sulfate (ZINCATE) 220 mg capsule Take 220 mg by mouth daily       No current facility-administered medications on file prior to visit.         Objective   /82 (BP Location: Left arm, Patient  "Position: Sitting, Cuff Size: Large)   Pulse 65   Temp (!) 97.3 °F (36.3 °C) (Tympanic)   Resp 15   Ht 5' 11\" (1.803 m)   Wt 108 kg (238 lb 3.2 oz)   SpO2 94%   BMI 33.22 kg/m²      Physical Exam  Vitals and nursing note reviewed.   Constitutional:       General: He is awake.      Appearance: Normal appearance. He is well-developed.   HENT:      Head: Normocephalic and atraumatic.      Nose: Nose normal.      Mouth/Throat:      Mouth: Mucous membranes are moist.   Eyes:      Conjunctiva/sclera: Conjunctivae normal.   Cardiovascular:      Rate and Rhythm: Normal rate and regular rhythm.      Pulses: Normal pulses.      Heart sounds: Normal heart sounds. No murmur heard.  Pulmonary:      Effort: Pulmonary effort is normal. No respiratory distress.      Breath sounds: Normal breath sounds.   Abdominal:      General: Bowel sounds are normal.      Palpations: Abdomen is soft.      Tenderness: There is no abdominal tenderness.   Musculoskeletal:      Cervical back: Neck supple.      Right lower leg: No edema.      Left lower leg: No edema.   Skin:     General: Skin is warm and dry.   Neurological:      Mental Status: He is alert and oriented to person, place, and time.      Motor: Motor function is intact.      Coordination: Coordination is intact.      Gait: Gait is intact.   Psychiatric:         Attention and Perception: Attention normal.         Mood and Affect: Mood normal.         Speech: Speech normal.         Behavior: Behavior normal. Behavior is cooperative.         Thought Content: Thought content normal.         Cognition and Memory: Cognition normal.         Judgment: Judgment normal.       Administrative Statements   I have spent a total time of 30 minutes in caring for this patient on the day of the visit/encounter including Diagnostic results, Prognosis, Risks and benefits of tx options, Instructions for management, Patient and family education, Importance of tx compliance, Risk factor reductions, " Impressions, Counseling / Coordination of care, Documenting in the medical record, Reviewing / ordering tests, medicine, procedures  , and Obtaining or reviewing history  . Topics discussed with the patient / family include symptom assessment and management, medication review, and psychosocial support.

## 2024-12-04 NOTE — ASSESSMENT & PLAN NOTE
This is a chronic disease process.   The patient is stable at this time.  We discussed diet and exercise.  Continue  Omeprazole    Orders:    Ambulatory Referral to Gastroenterology; Future

## 2024-12-06 ENCOUNTER — HOSPITAL ENCOUNTER (OUTPATIENT)
Dept: ULTRASOUND IMAGING | Facility: HOSPITAL | Age: 59
End: 2024-12-06
Payer: COMMERCIAL

## 2024-12-06 DIAGNOSIS — K76.0 HEPATIC STEATOSIS: Chronic | ICD-10-CM

## 2024-12-06 DIAGNOSIS — T39.1X4D: Chronic | ICD-10-CM

## 2024-12-06 PROCEDURE — 76700 US EXAM ABDOM COMPLETE: CPT

## 2024-12-10 ENCOUNTER — OFFICE VISIT (OUTPATIENT)
Dept: UROLOGY | Facility: AMBULATORY SURGERY CENTER | Age: 59
End: 2024-12-10
Payer: COMMERCIAL

## 2024-12-10 VITALS
DIASTOLIC BLOOD PRESSURE: 98 MMHG | HEIGHT: 71 IN | HEART RATE: 71 BPM | WEIGHT: 239 LBS | OXYGEN SATURATION: 96 % | SYSTOLIC BLOOD PRESSURE: 140 MMHG | BODY MASS INDEX: 33.46 KG/M2

## 2024-12-10 DIAGNOSIS — R31.0 GROSS HEMATURIA: Primary | ICD-10-CM

## 2024-12-10 DIAGNOSIS — N99.111 POSTPROCEDURAL BULBOUS URETHRAL STRICTURE: ICD-10-CM

## 2024-12-10 DIAGNOSIS — Z12.5 SCREENING FOR PROSTATE CANCER: ICD-10-CM

## 2024-12-10 DIAGNOSIS — N52.01 ERECTILE DYSFUNCTION DUE TO ARTERIAL INSUFFICIENCY: ICD-10-CM

## 2024-12-10 LAB — POST-VOID RESIDUAL VOLUME, ML POC: 27 ML

## 2024-12-10 PROCEDURE — 51798 US URINE CAPACITY MEASURE: CPT | Performed by: UROLOGY

## 2024-12-10 PROCEDURE — 99214 OFFICE O/P EST MOD 30 MIN: CPT | Performed by: UROLOGY

## 2024-12-10 RX ORDER — TADALAFIL 20 MG/1
20 TABLET ORAL DAILY PRN
Qty: 15 TABLET | Refills: 3 | Status: SHIPPED | OUTPATIENT
Start: 2024-12-10

## 2024-12-10 NOTE — PROGRESS NOTES
Name: Donnie Amato      : 1965      MRN: [id number]  Encounter Provider: Delvis Rey MD  Encounter Date: 12/10/2024   Encounter department: [facility] UROLOGY [place]  :  Assessment & Plan  Postprocedural bulbous urethral stricture    Orders:  •  POCT Measure PVR    Gross hematuria    Orders:  •  POCT Measure PVR    Erectile dysfunction due to arterial insufficiency    Orders:  •  tadalafil (CIALIS) 20 MG tablet; Take 1 tablet (20 mg total) by mouth daily as needed for erectile dysfunction    Screening for prostate cancer    Orders:  •  PSA, Total Screen; Future    Impression: Bulbar urethral stricture, routine prostate cancer screening    Plan: At this time the patient is hesitant to proceed with bulbar urethral stricture treatment with cold knife urethrotomy and paclitaxel balloon.  He is concerned about recurrence which could be worse than his current stricture disease.  At this time he is comfortable with his voiding pattern and his PVR in the office today is only 27 cc.  It is not unreasonable to continue with observation.  I recommend that he return in 6 months time with a postvoid residual assessment, uroflow evaluation, PSA, and digital rectal examination.  Patient was instructed to call sooner if he has worsening of his urinary stream or if he were to go into urinary retention.    History of Present Illness   Donnie Amato is a 59 y.o. male who presents in follow-up with a history of urethral stricture disease dating back almost 15 years ago.  He was previously dilated in the office by Dr. Carrasco.  Previously in May 2024 I attempted office cystoscopy found a stricture that was approximately 1 cm in length by visualization that I was unable to traverse.  This was identified on cystoscopy in May 2024.  The stricture was located in the bulbar urethra.  At the time of stricture identification I had recommended cold knife urethrotomy with an Optilume paclitaxel balloon.  The  "patient had been scheduled for the procedure in early November 2024 but canceled.  He returns to the office today for additional discussion.  PVR in the office today only 27 cc.    Review of Systems       Objective   /98 (BP Location: Left arm, Patient Position: Sitting, Cuff Size: Adult)   Pulse 71   Ht 5' 11\" (1.803 m)   Wt 108 kg (239 lb)   SpO2 96%   BMI 33.33 kg/m²     Physical Exam on examination he is in no acute distress.  Gait normal.  Affect normal    Results  Lab Results   Component Value Date    PSA 0.94 04/26/2024    PSA 0.8 05/06/2023     Lab Results   Component Value Date    CALCIUM 9.6 04/26/2024    K 3.9 04/26/2024    CO2 26 04/26/2024     04/26/2024    BUN 16 04/26/2024    CREATININE 0.84 04/26/2024     Lab Results   Component Value Date    WBC 6.98 04/26/2024    HGB 15.2 04/26/2024    HCT 45.5 04/26/2024    MCV 90 04/26/2024     04/26/2024       Office Urine Dip  Recent Results (from the past hour)   POCT Measure PVR    Collection Time: 12/10/24  2:13 PM   Result Value Ref Range    POST-VOID RESIDUAL VOLUME, ML POC 27 mL   ]      "

## 2024-12-11 ENCOUNTER — TELEPHONE (OUTPATIENT)
Dept: UROLOGY | Facility: AMBULATORY SURGERY CENTER | Age: 59
End: 2024-12-11

## 2024-12-11 ENCOUNTER — RESULTS FOLLOW-UP (OUTPATIENT)
Dept: FAMILY MEDICINE CLINIC | Facility: CLINIC | Age: 59
End: 2024-12-11

## 2024-12-11 NOTE — TELEPHONE ENCOUNTER
Patient inquired how he could obtain his records from 2 [facility] physicians, I was unable to complete the task yesterday due to a high volume of patient's and and work in the office.    I obtained the information for him and scanned it into  for him if he comes into the office to pick it up.     I called him and left a VM, I was able to obtain the information regarding obtaining his records from [facility], he can pick-up the forms at the [place] office since he lives closer to that office.    If he has any additional questions, please call us at [phone], thank-you

## 2025-01-03 ENCOUNTER — DOCUMENTATION (OUTPATIENT)
Dept: GASTROENTEROLOGY | Facility: CLINIC | Age: 60
End: 2025-01-03

## 2025-01-03 NOTE — PROGRESS NOTES
Records reviewed and outlined below in preparation for office visit 1/6/2025;    8/3/2024 ER visit for alcohol intoxication fall/head strike, loss of consciousness    6/19/2023 EGD  Irregular EG junction possible Deluna's esophagus.  Biopsies revealed Deluna's esophagus with glandular atypia and determinate for dysplasia.  Nonspecific chronic active inflammation and histologic features consistent with reflux was also noted.  Patient placed on omeprazole 40 mg daily.  Repeat EGD 3 months recommended  Small hiatal hernia  Polyps in the fundus measuring 4 mm.  Biopsy fundic gland polyp.  Biopsy negative for H. pylori  Gastric body, antrum, pylorus normal  Duodenum normal  No photos obtained due to loss of connectivity    4/26/2024 laboratory data  Chemistry complete normal  AST 26  ALT 39  Cholesterol 181  Triglycerides 99  HDL 50    Vitamin D 33.3  Vitamin B12 264  WBC 6.98 Hgb 15.2 HCT 45.5 MCV 90 platelet count 202,000  Hemoglobin A1c 5.5  HIV nonreactive    04/24/2023 colonoscopy Dr. Swanson  Indication positive Cologuard  Adult scope  Mild sigmoid colon diverticulosis  Hemorrhoids  3 mm polyp descending colon  Repeat colonoscopy 10 years recommended    11/3/2020 FibroSure  F1-F2

## 2025-01-06 ENCOUNTER — CONSULT (OUTPATIENT)
Dept: GASTROENTEROLOGY | Facility: CLINIC | Age: 60
End: 2025-01-06
Payer: COMMERCIAL

## 2025-01-06 ENCOUNTER — PREP FOR PROCEDURE (OUTPATIENT)
Dept: GASTROENTEROLOGY | Facility: CLINIC | Age: 60
End: 2025-01-06

## 2025-01-06 VITALS
TEMPERATURE: 97.7 F | HEIGHT: 71 IN | DIASTOLIC BLOOD PRESSURE: 70 MMHG | HEART RATE: 83 BPM | RESPIRATION RATE: 16 BRPM | WEIGHT: 247 LBS | OXYGEN SATURATION: 97 % | SYSTOLIC BLOOD PRESSURE: 126 MMHG | BODY MASS INDEX: 34.58 KG/M2

## 2025-01-06 DIAGNOSIS — B18.2 CHRONIC HEPATITIS C WITHOUT HEPATIC COMA (HCC): ICD-10-CM

## 2025-01-06 DIAGNOSIS — K21.9 GASTROESOPHAGEAL REFLUX DISEASE WITHOUT ESOPHAGITIS: Chronic | ICD-10-CM

## 2025-01-06 DIAGNOSIS — E53.8 VITAMIN B12 DEFICIENCY: Chronic | ICD-10-CM

## 2025-01-06 DIAGNOSIS — E53.8 DEFICIENCY OF OTHER SPECIFIED B GROUP VITAMINS: ICD-10-CM

## 2025-01-06 DIAGNOSIS — K76.0 HEPATIC STEATOSIS: Chronic | ICD-10-CM

## 2025-01-06 DIAGNOSIS — K22.719 BARRETT'S ESOPHAGUS WITH DYSPLASIA: Primary | Chronic | ICD-10-CM

## 2025-01-06 DIAGNOSIS — R76.8 HCV ANTIBODY POSITIVE: Chronic | ICD-10-CM

## 2025-01-06 DIAGNOSIS — K76.0 METABOLIC DYSFUNCTION-ASSOCIATED STEATOTIC LIVER DISEASE (MASLD): ICD-10-CM

## 2025-01-06 DIAGNOSIS — Z11.59 ENCOUNTER FOR SCREENING FOR OTHER VIRAL DISEASES: ICD-10-CM

## 2025-01-06 PROBLEM — Z86.19 HISTORY OF HEPATITIS C: Status: ACTIVE | Noted: 2025-01-06

## 2025-01-06 PROCEDURE — 99205 OFFICE O/P NEW HI 60 MIN: CPT | Performed by: INTERNAL MEDICINE

## 2025-01-06 RX ORDER — SODIUM CHLORIDE, SODIUM LACTATE, POTASSIUM CHLORIDE, CALCIUM CHLORIDE 600; 310; 30; 20 MG/100ML; MG/100ML; MG/100ML; MG/100ML
125 INJECTION, SOLUTION INTRAVENOUS CONTINUOUS
OUTPATIENT
Start: 2025-01-06

## 2025-01-06 RX ORDER — FAMOTIDINE 40 MG/1
40 TABLET, FILM COATED ORAL
Qty: 90 TABLET | Refills: 3 | Status: SHIPPED | OUTPATIENT
Start: 2025-01-06

## 2025-01-06 NOTE — ASSESSMENT & PLAN NOTE
B12 level was 264.  He states he was given a prescription for vitamin B12.  He should be taking 1000 mcg of vitamin B12 daily.  If he can find the under the tongue (sublingual formula) this would be better absorbed.

## 2025-01-06 NOTE — ASSESSMENT & PLAN NOTE
Patient does have a history of hepatitis C.  This was treated with Harvoni for 12 weeks back in 2015.  He does report having a FibroSure revealing F4 fibrosis at 1 time.  His last FibroSure revealed F1-F2 fibrosis in 2020.  Check ultrasound elastography  Check hepatitis C RNA quantitative test by PCR although I suspect he has been cured of hepatitis C.  Orders:  •  Hepatitis C RNA, Quantitative PCR; Future  •  Hepatitis B core antibody, total; Future  •  Hepatitis B surface antigen; Future  •  Hepatitis B surface antibody; Future  •  Hepatitis A antibody, total; Future  •  US elastography/UGAP; Future

## 2025-01-06 NOTE — ASSESSMENT & PLAN NOTE
Bryan does have fatty liver noted on ultrasound.  He does have a history of hepatitis C treated in 2015.  He does not have any identifiable risk factors for hepatitis C so it is not clear when he acquired this.  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  In addition he inform me that the government has identified certain exposures as potential causes of steatosis and he was exposed to phenol's.  Not familiar with these types of exposures.  He may actually benefit from hepatology consultation at some point.  He does have other reasons for fatty liver, he may drink alcohol 4-5 times a year and may binge at times.  His body mass index is 34.45.  Check lipid profile  Check hemoglobin A1c  Weight reduction will be helpful as it relates to liver disease.  Check ultrasound elastography  Check hepatitis A, and B serologies and vaccinate if he is not immune to hepatitis A or hepatitis B  I did also recommend the avoid and abstain from alcohol.    In addition I did avoid advised him to limit or avoid NSAIDs such as Advil, Aleve, ibuprofen, Excedrin, excetra.    Orders:  •  US elastography/UGAP; Future  •  Vitamin B12; Future  •  Folate; Future  •  CBC; Future  •  Comprehensive metabolic panel; Future

## 2025-01-06 NOTE — ASSESSMENT & PLAN NOTE
Patient was noted to have Deluna's esophagus on EGD done in June 2023.  Biopsies were indefinite for dysplasia.  Repeat EGD was recommended however patient states this procedure was canceled.  Continue omeprazole 40 mg daily.  Repeat EGD.  I explained to the patient the procedure of upper endoscopy as well as its potential risk which are approximately 1 in 1000 chance of bleeding, infection, and perforation.      Orders:  •  Ambulatory Referral to Gastroenterology  •  EGD; Future

## 2025-01-06 NOTE — PROGRESS NOTES
Name: Donnie Amato      : 1965      MRN: [id number]  Encounter Provider: Donato Triana DO  Encounter Date: 2025   Encounter department: [facility] [place]  :  Assessment & Plan  Deluna's esophagus with dysplasia  Patient was noted to have Deluna's esophagus on EGD done in 2023.  Biopsies were indefinite for dysplasia.  Repeat EGD was recommended however patient states this procedure was canceled.  Continue omeprazole 40 mg daily.  Repeat EGD.  I explained to the patient the procedure of upper endoscopy as well as its potential risk which are approximately 1 in 1000 chance of bleeding, infection, and perforation.      Orders:  •  Ambulatory Referral to Gastroenterology  •  EGD; Future    Gastroesophageal reflux disease without esophagitis  Patient does have GERD.  In fact he does describe having heartburn a couple times a week and nocturnal regurgitation few times a week as well.  Continue omeprazole 40 mg daily Best to take this 30 minutes to 1 hour before 1 meal a day.  I did recommend the addition of famotidine 40 mg 2 hours before bed.  Lifestyle modifications for gastroesophageal reflux disease were discussed and include limiting fried and fatty foods, mints, chocolates, carbonated and caffeinated beverages , and alcohol, etc.  Avoid lying down for 2-3 hours after meals.  If you have nighttime symptoms consider raising the head of the bed up on 4-6 inch blocks.  Pillows typically are not useful.  If you are overweight, weight loss will be helpful.      Orders:  •  Ambulatory Referral to Gastroenterology  •  EGD; Future  •  famotidine (PEPCID) 40 MG tablet; Take 1 tablet (40 mg total) by mouth daily at bedtime Take in evening 2 hours prior to bed    Hepatic steatosis    Orders:  •  Ambulatory Referral to Gastroenterology    Metabolic dysfunction-associated steatotic liver disease (MASLD)  Bryan does have fatty liver noted on ultrasound.  He does have a  history of hepatitis C treated in 2015.  He does not have any identifiable risk factors for hepatitis C so it is not clear when he acquired this.  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  In addition he inform me that the government has identified certain exposures as potential causes of steatosis and he was exposed to phenol's.  Not familiar with these types of exposures.  He may actually benefit from hepatology consultation at some point.  He does have other reasons for fatty liver, he may drink alcohol 4-5 times a year and may binge at times.  His body mass index is 34.45.  Check lipid profile  Check hemoglobin A1c  Weight reduction will be helpful as it relates to liver disease.  Check ultrasound elastography  Check hepatitis A, and B serologies and vaccinate if he is not immune to hepatitis A or hepatitis B  I did also recommend the avoid and abstain from alcohol.    In addition I did avoid advised him to limit or avoid NSAIDs such as Advil, Aleve, ibuprofen, Excedrin, excetra.    Orders:  •  US elastography/UGAP; Future  •  Vitamin B12; Future  •  Folate; Future  •  CBC; Future  •  Comprehensive metabolic panel; Future    HCV antibody positive  Patient does have a history of hepatitis C.  This was treated with Harvoni for 12 weeks back in 2015.  He does report having a FibroSure revealing F4 fibrosis at 1 time.  His last FibroSure revealed F1-F2 fibrosis in 2020.  Check ultrasound elastography  Check hepatitis C RNA quantitative test by PCR although I suspect he has been cured of hepatitis C.  Orders:  •  Hepatitis C RNA, Quantitative PCR; Future  •  Hepatitis B core antibody, total; Future  •  Hepatitis B surface antigen; Future  •  Hepatitis B surface antibody; Future  •  Hepatitis A antibody, total; Future  •  US elastography/UGAP; Future    Vitamin B12 deficiency  B12 level was 264.  He states he was given a prescription for vitamin B12.  He should be taking  1000 mcg of vitamin B12 daily.  If he can find the under the tongue (sublingual formula) this would be better absorbed.    Chronic hepatitis C without hepatic coma (HCC)  Hepatitis C has been successfully treated in 2015 with Carina.       Encounter for screening for other viral diseases    Orders:  •  Hepatitis B core antibody, total; Future  •  Hepatitis B surface antigen; Future  •  Hepatitis B surface antibody; Future    Deficiency of other specified B group vitamins    Orders:  •  Vitamin B12; Future  •  Folate; Future        History of Present Illness   HPI  Donnie Amato is a 59 y.o. male who presents in consultation for fatty liver, history of hepatitis C, GERD, and Deluna's esophagus.  He reports that he just has not been feeling well.  But in retrospect he really did not feel well back in his 20s when he was in the [employer].  He states frequently he has a right sided pain especially if he tries to workout or exert himself.  This is not on a daily basis however.  He was recently told by a  of  from the [employer] to cut back on Coca[employer] and does not drink this anymore.  He feels this is helping him feel better.    He does report having heartburn during the day maybe 2-3 times a week.  He also reports nocturnal regurgitation a couple times a week as well.  He denies any dysphagia.  He does use omeprazole 40 mg once a day, he may also take an over-the-counter famotidine, he is not sure the dose, either 10 or 20 mg he will take this a couple times a week.  He denies any vomiting.    His bowel habits are fairly regular.  Denies any diarrhea, constipation, bleeding or black stools.  He is up-to-date with colonoscopy and has last colonoscopy in April 2023 for a positive Cologuard.  He was told to follow-up in 10 years for his next colonoscopy.    He inform me that at 1 point a FibroSure revealed F4 fibrosis.  His last FibroSure was performed in 2020 this revealed F1-F2 fibrosis.  He was  treated for hepatitis C back in 2015.  He had genotype Ia.  He was treated with Harvoni for 12 weeks.  He did have a follow-up hep C RNA that was negative in 2015.  He does occasionally drink alcohol.  He states he will drink 5 or 6 days a year usually during football season with friends.  He may drink a few beers on those days.    There is been no unintentional weight loss.    Maternal grandmother may have had colon cancer at an advanced age (77).  There are no other family members with colon cancer or colon polyps or esophageal cancer or Deluna's esophagus    He does not smoke tobacco.  He does drink beer as noted above  He does report that he was in the [employer] for 3 years and was stationed at [employer] for a period of time.  The [employer] have tested him and he was told he exposed to benzene's excetra.  [facility]    Records reviewed and outlined below.    12/6/2024 ultrasound abdomen.  Moderate diffuse hepatic steatosis.  Spleen normal  Gallbladder normal  Liver 18.2 cm midclavicular line.      8/3/2024 ER visit for alcohol intoxication fall/head strike, loss of consciousness     6/19/2023 EGD  Irregular EG junction possible Deluna's esophagus.  Biopsies revealed Deluna's esophagus with glandular atypia and determinate for dysplasia.  Nonspecific chronic active inflammation and histologic features consistent with reflux was also noted.  Patient placed on omeprazole 40 mg daily.  Repeat EGD 3 months recommended  Small hiatal hernia  Polyps in the fundus measuring 4 mm.  Biopsy fundic gland polyp.  Biopsy negative for H. pylori  Gastric body, antrum, pylorus normal  Duodenum normal  No photos obtained due to loss of connectivity     4/26/2024 laboratory data  Chemistry complete normal  AST 26  ALT 39  Cholesterol 181  Triglycerides 99  HDL 50    Vitamin D 33.3  Vitamin B12 264  WBC 6.98 Hgb 15.2 HCT 45.5 MCV 90 platelet count 202,000  Hemoglobin A1c 5.5  HIV  nonreactive     04/24/2023 colonoscopy Dr. Swanson  Indication positive Cologuard  Adult scope  Mild sigmoid colon diverticulosis  Hemorrhoids  3 mm polyp descending colon  Repeat colonoscopy 10 years recommended     11/3/2020 FibroSure  F1-F2      Review of Systems  Past Medical History   Past Medical History:   Diagnosis Date   • Arthritis     last assessed 11/30/17    • BPH without urinary obstruction     last assessed 11/30/17   • Erectile dysfunction     last assessed 11/30/17    • Medicare annual wellness visit, subsequent 03/23/2021   • Urethral stricture     last assessed 11/30/17    • Weak urinary stream     last assessed 11/30/17      Past Surgical History:   Procedure Laterality Date   • BACK SURGERY      last assessed 11/30/17    • CYSTOSCOPY      Bladder, last assessed 11/30/17    • FOOT SURGERY     • KNEE SURGERY      last assessed 11/30/17    • URETHRAL DILATION      last assessed 11/30/17      Family History   Problem Relation Age of Onset   • Diabetes Mother    • Heart disease Father         cardiac disorder    • Stroke Father         CVA      reports that he quit smoking about 27 years ago. His smoking use included cigarettes. He has never been exposed to tobacco smoke. He has never used smokeless tobacco. He reports current alcohol use. He reports that he does not currently use drugs.  Current Outpatient Medications on File Prior to Visit   Medication Sig Dispense Refill   • cholecalciferol (VITAMIN D3) 1,000 units tablet Take 2 tablets (2,000 Units total) by mouth daily 180 tablet 3   • ibuprofen (MOTRIN) 600 mg tablet Take 1 tablet (600 mg total) by mouth every 6 (six) hours as needed for mild pain 120 tablet 0   • omeprazole (PriLOSEC) 40 MG capsule Take 40 mg by mouth daily as needed     • penciclovir (DENAVIR) 1 % cream Apply topically every 2 (two) hours 1.5 g 0   • POTASSIUM CHLORIDE ER PO Take by mouth     • tadalafil (CIALIS) 20 MG tablet Take 1 tablet (20 mg total) by mouth daily as  "needed for erectile dysfunction 15 tablet 3   • vitamin B-12 (VITAMIN B-12) 500 mcg tablet Take 1 tablet (500 mcg total) by mouth daily 90 tablet 3   • zinc sulfate (ZINCATE) 220 mg capsule Take 220 mg by mouth daily       No current facility-administered medications on file prior to visit.   No Known Allergies   Current Outpatient Medications on File Prior to Visit   Medication Sig Dispense Refill   • cholecalciferol (VITAMIN D3) 1,000 units tablet Take 2 tablets (2,000 Units total) by mouth daily 180 tablet 3   • ibuprofen (MOTRIN) 600 mg tablet Take 1 tablet (600 mg total) by mouth every 6 (six) hours as needed for mild pain 120 tablet 0   • omeprazole (PriLOSEC) 40 MG capsule Take 40 mg by mouth daily as needed     • penciclovir (DENAVIR) 1 % cream Apply topically every 2 (two) hours 1.5 g 0   • POTASSIUM CHLORIDE ER PO Take by mouth     • tadalafil (CIALIS) 20 MG tablet Take 1 tablet (20 mg total) by mouth daily as needed for erectile dysfunction 15 tablet 3   • vitamin B-12 (VITAMIN B-12) 500 mcg tablet Take 1 tablet (500 mcg total) by mouth daily 90 tablet 3   • zinc sulfate (ZINCATE) 220 mg capsule Take 220 mg by mouth daily       No current facility-administered medications on file prior to visit.      Social History     Tobacco Use   • Smoking status: Former     Current packs/day: 0.00     Types: Cigarettes     Quit date:      Years since quittin.0     Passive exposure: Never   • Smokeless tobacco: Never   • Tobacco comments:     Never a smoker   Vaping Use   • Vaping status: Never Used   Substance and Sexual Activity   • Alcohol use: Yes     Comment: Social, rarely   • Drug use: Not Currently   • Sexual activity: Yes     Partners: Female        Objective   /70   Pulse 83   Temp 97.7 °F (36.5 °C)   Resp 16   Ht 5' 11\" (1.803 m)   Wt 112 kg (247 lb)   SpO2 97%   BMI 34.45 kg/m²      Physical Exam    General Appearance: Alert, cooperative, no distress  HEENT: Normocephalic, atraumatic, " anicteric.   Neck: Supple, symmetrical, trachea midline  Lungs: Clear to auscultation bilaterally; no rales, rhonchi or wheezing; respirations unlabored   Heart: Regular rate and rhythm; no murmur, rub, or gallop.  Abdomen: Soft, bowel sounds normal, non-tender, non-distended; no masses, there is no hepatosplenomegaly. No spider angiomas  Obese abdomen  Genitalia: Deferred   Rectal: Deferred   Extremities: No cyanosis, clubbing or edema   Skin: No jaundice, rashes, or lesions   Lymph nodes: No palpable cervical lymphadenopathy   I have spent a total time of 61 minutes in caring for this patient on the day of the visit/encounter including Importance of tx compliance, Impressions, Counseling / Coordination of care, Documenting in the medical record, Reviewing / ordering tests, medicine, procedures  , and Obtaining or reviewing history  .

## 2025-01-06 NOTE — ASSESSMENT & PLAN NOTE
Patient does have GERD.  In fact he does describe having heartburn a couple times a week and nocturnal regurgitation few times a week as well.  Continue omeprazole 40 mg daily Best to take this 30 minutes to 1 hour before 1 meal a day.  I did recommend the addition of famotidine 40 mg 2 hours before bed.  Lifestyle modifications for gastroesophageal reflux disease were discussed and include limiting fried and fatty foods, mints, chocolates, carbonated and caffeinated beverages , and alcohol, etc.  Avoid lying down for 2-3 hours after meals.  If you have nighttime symptoms consider raising the head of the bed up on 4-6 inch blocks.  Pillows typically are not useful.  If you are overweight, weight loss will be helpful.      Orders:  •  Ambulatory Referral to Gastroenterology  •  EGD; Future  •  famotidine (PEPCID) 40 MG tablet; Take 1 tablet (40 mg total) by mouth daily at bedtime Take in evening 2 hours prior to bed

## 2025-01-06 NOTE — PATIENT INSTRUCTIONS
Scheduled date of EGD(as of today): 2/17/24  Physician performing EGD: Thomas  Location of EGD: [place]  Instructions reviewed with patient by: Mireille  Clearances:  None      Deluna's esophagus with dysplasia  Patient was noted to have Deluna's esophagus on EGD done in June 2023.  Biopsies were indefinite for dysplasia.  Repeat EGD was recommended however patient states this procedure was canceled.  Continue omeprazole 40 mg daily.  Repeat EGD.  I explained to the patient the procedure of upper endoscopy as well as its potential risk which are approximately 1 in 1000 chance of bleeding, infection, and perforation.      Orders:    Ambulatory Referral to Gastroenterology    EGD; Future    Gastroesophageal reflux disease without esophagitis  Patient does have GERD.  In fact he does describe having heartburn a couple times a week and nocturnal regurgitation few times a week as well.  Continue omeprazole 40 mg daily Best to take this 30 minutes to 1 hour before 1 meal a day.  I did recommend the addition of famotidine 40 mg 2 hours before bed.  Lifestyle modifications for gastroesophageal reflux disease were discussed and include limiting fried and fatty foods, mints, chocolates, carbonated and caffeinated beverages , and alcohol, etc.  Avoid lying down for 2-3 hours after meals.  If you have nighttime symptoms consider raising the head of the bed up on 4-6 inch blocks.  Pillows typically are not useful.  If you are overweight, weight loss will be helpful.      Orders:    Ambulatory Referral to Gastroenterology    EGD; Future    Hepatic steatosis    Orders:    Ambulatory Referral to Gastroenterology    Metabolic dysfunction-associated steatotic liver disease (MASLD)  Bryan does have fatty liver noted on ultrasound.  He does have a history of hepatitis C treated in 2015.  He does not have any identifiable risk factors for hepatitis C so it is not clear when he acquired this.  He believes he may have acquired it when he  was in the [employer] which he entered in 1982.  In addition he inform me that the government has identified certain exposures as potential causes of steatosis and he was exposed to phenol's.  Not familiar with these types of exposures.  He may actually benefit from hepatology consultation at some point.  He does have other reasons for fatty liver, he may drink alcohol 4-5 times a year and may binge at times.  His body mass index is 34.45.  Check lipid profile  Check hemoglobin A1c  Weight reduction will be helpful as it relates to liver disease.  Check ultrasound elastography  Check hepatitis A, and B serologies and vaccinate if he is not immune to hepatitis A or hepatitis B  I did also recommend the avoid and abstain from alcohol.    In addition I did avoid advised him to limit or avoid NSAIDs such as Advil, Aleve, ibuprofen, Excedrin, excetra.         HCV antibody positive  Patient does have a history of hepatitis C.  This was treated with Harvoni for 12 weeks back in 2015.  He does report having a FibroSure revealing F4 fibrosis at 1 time.  His last FibroSure revealed F1-F2 fibrosis in 2020.  Check ultrasound elastography  Check hepatitis C RNA quantitative test by PCR although I suspect he has been cured of hepatitis C.       Vitamin B12 deficiency  B12 level was 264.  He states he was given a prescription for vitamin B12.  He should be taking 1000 mcg of vitamin B12 daily.  If he can find the under the tongue (sublingual formula) this would be better absorbed.    Chronic hepatitis C without hepatic coma (HCC)  Hepatitis C has been successfully treated in 2015 with Harvoni.

## 2025-01-23 ENCOUNTER — RESULTS FOLLOW-UP (OUTPATIENT)
Dept: GASTROENTEROLOGY | Facility: CLINIC | Age: 60
End: 2025-01-23

## 2025-01-23 ENCOUNTER — HOSPITAL ENCOUNTER (OUTPATIENT)
Dept: ULTRASOUND IMAGING | Facility: HOSPITAL | Age: 60
End: 2025-01-23
Attending: INTERNAL MEDICINE
Payer: COMMERCIAL

## 2025-01-23 DIAGNOSIS — R76.8 HCV ANTIBODY POSITIVE: Chronic | ICD-10-CM

## 2025-01-23 DIAGNOSIS — K76.0 METABOLIC DYSFUNCTION-ASSOCIATED STEATOTIC LIVER DISEASE (MASLD): ICD-10-CM

## 2025-01-23 PROCEDURE — 76981 USE PARENCHYMA: CPT

## 2025-01-23 NOTE — RESULT ENCOUNTER NOTE
I sent patient a [employer] message stating that;  Kp Adams, writing to review your ultrasound elastography results.  Good news and that there is no evidence little to no scarring in the liver.  The score is F2 0 to F1 basically this is essentially normal.  There is however evidence of fat in the liver.Based upon these findings the primary treatment is some weight reduction.  Would limit alcohol intake.  If lipids are elevated or hemoglobin A1c is elevated would recommend management of both of those.  Awaiting labs.  Please feel free to reach out if you have any questions or concerns.  Best regards,  Dr. Triana

## 2025-01-24 ENCOUNTER — APPOINTMENT (OUTPATIENT)
Dept: LAB | Facility: CLINIC | Age: 60
End: 2025-01-24
Payer: COMMERCIAL

## 2025-01-24 DIAGNOSIS — Z11.59 ENCOUNTER FOR SCREENING FOR OTHER VIRAL DISEASES: ICD-10-CM

## 2025-01-24 DIAGNOSIS — E53.8 DEFICIENCY OF OTHER SPECIFIED B GROUP VITAMINS: ICD-10-CM

## 2025-01-24 DIAGNOSIS — K76.0 METABOLIC DYSFUNCTION-ASSOCIATED STEATOTIC LIVER DISEASE (MASLD): ICD-10-CM

## 2025-01-24 DIAGNOSIS — R76.8 HCV ANTIBODY POSITIVE: Chronic | ICD-10-CM

## 2025-01-24 LAB
ALBUMIN SERPL BCG-MCNC: 4.6 G/DL (ref 3.5–5)
ALP SERPL-CCNC: 36 U/L (ref 34–104)
ALT SERPL W P-5'-P-CCNC: 24 U/L (ref 7–52)
ANION GAP SERPL CALCULATED.3IONS-SCNC: 9 MMOL/L (ref 4–13)
AST SERPL W P-5'-P-CCNC: 21 U/L (ref 13–39)
BILIRUB SERPL-MCNC: 0.37 MG/DL (ref 0.2–1)
BUN SERPL-MCNC: 17 MG/DL (ref 5–25)
CALCIUM SERPL-MCNC: 9.5 MG/DL (ref 8.4–10.2)
CHLORIDE SERPL-SCNC: 108 MMOL/L (ref 96–108)
CO2 SERPL-SCNC: 26 MMOL/L (ref 21–32)
CREAT SERPL-MCNC: 0.71 MG/DL (ref 0.6–1.3)
ERYTHROCYTE [DISTWIDTH] IN BLOOD BY AUTOMATED COUNT: 12.8 % (ref 11.6–15.1)
FOLATE SERPL-MCNC: >22.3 NG/ML
GFR SERPL CREATININE-BSD FRML MDRD: 101 ML/MIN/1.73SQ M
GLUCOSE P FAST SERPL-MCNC: 93 MG/DL (ref 65–99)
HAV AB SER QL IA: REACTIVE
HBV CORE AB SER QL: NORMAL
HBV SURFACE AB SER-ACNC: 54.2 MIU/ML
HBV SURFACE AG SER QL: NORMAL
HCT VFR BLD AUTO: 44.7 % (ref 36.5–49.3)
HGB BLD-MCNC: 15.4 G/DL (ref 12–17)
MCH RBC QN AUTO: 30.5 PG (ref 26.8–34.3)
MCHC RBC AUTO-ENTMCNC: 34.5 G/DL (ref 31.4–37.4)
MCV RBC AUTO: 89 FL (ref 82–98)
PLATELET # BLD AUTO: 202 THOUSANDS/UL (ref 149–390)
PMV BLD AUTO: 10.7 FL (ref 8.9–12.7)
POTASSIUM SERPL-SCNC: 4.1 MMOL/L (ref 3.5–5.3)
PROT SERPL-MCNC: 7.1 G/DL (ref 6.4–8.4)
RBC # BLD AUTO: 5.05 MILLION/UL (ref 3.88–5.62)
SODIUM SERPL-SCNC: 143 MMOL/L (ref 135–147)
VIT B12 SERPL-MCNC: 303 PG/ML (ref 180–914)
WBC # BLD AUTO: 6.76 THOUSAND/UL (ref 4.31–10.16)

## 2025-01-24 PROCEDURE — 86706 HEP B SURFACE ANTIBODY: CPT

## 2025-01-24 PROCEDURE — 87340 HEPATITIS B SURFACE AG IA: CPT

## 2025-01-24 PROCEDURE — 85027 COMPLETE CBC AUTOMATED: CPT

## 2025-01-24 PROCEDURE — 36415 COLL VENOUS BLD VENIPUNCTURE: CPT

## 2025-01-24 PROCEDURE — 80053 COMPREHEN METABOLIC PANEL: CPT

## 2025-01-24 PROCEDURE — 87521 HEPATITIS C PROBE&RVRS TRNSC: CPT

## 2025-01-24 PROCEDURE — 87522 HEPATITIS C REVRS TRNSCRPJ: CPT

## 2025-01-24 PROCEDURE — 82607 VITAMIN B-12: CPT

## 2025-01-24 PROCEDURE — 82746 ASSAY OF FOLIC ACID SERUM: CPT

## 2025-01-24 PROCEDURE — 86708 HEPATITIS A ANTIBODY: CPT

## 2025-01-24 PROCEDURE — 86704 HEP B CORE ANTIBODY TOTAL: CPT

## 2025-01-24 NOTE — RESULT ENCOUNTER NOTE
Kp Fields, your hepatitis A is not abnormal, it just means that you are exposed to hepatitis A in the past and you are currently immune to hepatitis A.  This is good news.  Your chemistry panel looked normal.  You are immune to hepatitis B as well.  Your CBC was normal.  Your folic acid was normal.  Hepatitis C RNA is pending.  Your vitamin B12 level is normal but low normal.  Based upon this result I would recommend you take a vitamin B12 supplement about 500 mcg daily.  If you can find the under the tongue (sublingual formula) this would be best.  Best regards,  Dr. Triana

## 2025-01-27 LAB — HCV RNA SERPL NAA+PROBE-ACNC: NOT DETECTED K[IU]/ML

## 2025-05-07 ENCOUNTER — TELEPHONE (OUTPATIENT)
Dept: FAMILY MEDICINE CLINIC | Facility: CLINIC | Age: 60
End: 2025-05-07

## 2025-05-07 NOTE — TELEPHONE ENCOUNTER
Left message instructing pt to call back. Pt has dragan scheduled with [facility] on 05/13, but he will be due for his AWV after 05/30. I would like to know if would be okay with rescheduling his dragan for the beginning of June, and we can do his AWV & he can also get his labs done.

## 2025-05-27 ENCOUNTER — RA CDI HCC (OUTPATIENT)
Dept: OTHER | Facility: HOSPITAL | Age: 60
End: 2025-05-27

## 2025-06-03 ENCOUNTER — OFFICE VISIT (OUTPATIENT)
Dept: FAMILY MEDICINE CLINIC | Facility: CLINIC | Age: 60
End: 2025-06-03
Payer: COMMERCIAL

## 2025-06-03 VITALS
HEART RATE: 81 BPM | SYSTOLIC BLOOD PRESSURE: 130 MMHG | WEIGHT: 252.2 LBS | HEIGHT: 73 IN | BODY MASS INDEX: 33.43 KG/M2 | OXYGEN SATURATION: 96 % | DIASTOLIC BLOOD PRESSURE: 82 MMHG | TEMPERATURE: 97.2 F | RESPIRATION RATE: 16 BRPM

## 2025-06-03 DIAGNOSIS — R73.01 IMPAIRED FASTING GLUCOSE: ICD-10-CM

## 2025-06-03 DIAGNOSIS — Z00.00 MEDICARE ANNUAL WELLNESS VISIT, SUBSEQUENT: Primary | Chronic | ICD-10-CM

## 2025-06-03 DIAGNOSIS — E66.811 CLASS 1 OBESITY DUE TO DISRUPTION OF MC4R PATHWAY WITHOUT SERIOUS COMORBIDITY WITH BODY MASS INDEX (BMI) OF 33.0 TO 33.9 IN ADULT: ICD-10-CM

## 2025-06-03 DIAGNOSIS — G89.29 CHRONIC BILATERAL BACK PAIN, UNSPECIFIED BACK LOCATION: Chronic | ICD-10-CM

## 2025-06-03 DIAGNOSIS — G89.29 CHRONIC PAIN OF BOTH KNEES: Chronic | ICD-10-CM

## 2025-06-03 DIAGNOSIS — K21.9 GASTROESOPHAGEAL REFLUX DISEASE WITHOUT ESOPHAGITIS: Chronic | ICD-10-CM

## 2025-06-03 DIAGNOSIS — K22.719 BARRETT'S ESOPHAGUS WITH DYSPLASIA: Chronic | ICD-10-CM

## 2025-06-03 DIAGNOSIS — M54.9 CHRONIC BILATERAL BACK PAIN, UNSPECIFIED BACK LOCATION: Chronic | ICD-10-CM

## 2025-06-03 DIAGNOSIS — E78.00 PURE HYPERCHOLESTEROLEMIA: ICD-10-CM

## 2025-06-03 DIAGNOSIS — Z79.899 ON LONG TERM DRUG THERAPY: ICD-10-CM

## 2025-06-03 DIAGNOSIS — Z00.00 ANNUAL PHYSICAL EXAM: ICD-10-CM

## 2025-06-03 DIAGNOSIS — Z23 ENCOUNTER FOR IMMUNIZATION: ICD-10-CM

## 2025-06-03 DIAGNOSIS — R94.6 ABNORMAL RESULTS OF THYROID FUNCTION STUDIES: ICD-10-CM

## 2025-06-03 DIAGNOSIS — E88.82 CLASS 1 OBESITY DUE TO DISRUPTION OF MC4R PATHWAY WITHOUT SERIOUS COMORBIDITY WITH BODY MASS INDEX (BMI) OF 33.0 TO 33.9 IN ADULT: ICD-10-CM

## 2025-06-03 DIAGNOSIS — E83.40 DISORDERS OF MAGNESIUM METABOLISM, UNSPECIFIED: ICD-10-CM

## 2025-06-03 DIAGNOSIS — E55.9 VITAMIN D DEFICIENCY: ICD-10-CM

## 2025-06-03 DIAGNOSIS — K76.0 METABOLIC DYSFUNCTION-ASSOCIATED STEATOTIC LIVER DISEASE (MASLD): Chronic | ICD-10-CM

## 2025-06-03 DIAGNOSIS — Z12.5 SCREENING FOR MALIGNANT NEOPLASM OF PROSTATE: ICD-10-CM

## 2025-06-03 DIAGNOSIS — M25.561 CHRONIC PAIN OF BOTH KNEES: Chronic | ICD-10-CM

## 2025-06-03 DIAGNOSIS — N52.1 ERECTILE DYSFUNCTION DUE TO DISEASES CLASSIFIED ELSEWHERE: Chronic | ICD-10-CM

## 2025-06-03 DIAGNOSIS — Z15.2 CLASS 1 OBESITY DUE TO DISRUPTION OF MC4R PATHWAY WITHOUT SERIOUS COMORBIDITY WITH BODY MASS INDEX (BMI) OF 33.0 TO 33.9 IN ADULT: ICD-10-CM

## 2025-06-03 DIAGNOSIS — M25.562 CHRONIC PAIN OF BOTH KNEES: Chronic | ICD-10-CM

## 2025-06-03 DIAGNOSIS — D51.8 VITAMIN B12 DEFICIENCY (DIETARY) ANEMIA: ICD-10-CM

## 2025-06-03 DIAGNOSIS — T39.1X4S: Chronic | ICD-10-CM

## 2025-06-03 PROBLEM — E53.8 VITAMIN B12 DEFICIENCY: Status: RESOLVED | Noted: 2024-05-30 | Resolved: 2025-06-03

## 2025-06-03 PROBLEM — Z13.220 SCREENING FOR HYPERLIPIDEMIA: Status: RESOLVED | Noted: 2022-05-04 | Resolved: 2025-06-03

## 2025-06-03 PROBLEM — Z86.19 HISTORY OF HEPATITIS C: Chronic | Status: RESOLVED | Noted: 2025-01-06 | Resolved: 2025-06-03

## 2025-06-03 PROBLEM — Z13.1 SCREENING FOR DIABETES MELLITUS: Status: RESOLVED | Noted: 2022-05-04 | Resolved: 2025-06-03

## 2025-06-03 PROBLEM — Z86.19 HISTORY OF HEPATITIS C: Chronic | Status: ACTIVE | Noted: 2025-01-06

## 2025-06-03 PROCEDURE — G0439 PPPS, SUBSEQ VISIT: HCPCS | Performed by: NURSE PRACTITIONER

## 2025-06-03 RX ORDER — ERGOCALCIFEROL 1.25 MG/1
50000 CAPSULE, LIQUID FILLED ORAL WEEKLY
Qty: 27 CAPSULE | Refills: 1 | Status: SHIPPED | OUTPATIENT
Start: 2025-06-03

## 2025-06-03 NOTE — ASSESSMENT & PLAN NOTE
Vitamin D   Your Vitamin D level should be close to 100.  Helps with:  Decreasing Fatigue - Improves Energy  Fights Depression  Decreases Anxiety  Is a Neurotransporter Increasing Serotonin Levels  Improves Bone Health and Helps Prevent Osteoporosis  Is an Essential Nutrient  You Can ONLY Absorb the Proper Amount of Calcium When You Have the Correct Vitamin D Level  Decreases Wrinkles and Fine Lines by working with Collagen Production  Improves Immune Health  Muscle Strength  Brain Cells - To Decrease Brain Fog  Decreases Inflammation  Improved Glucose Metabolism - Helping With Weight Loss  Hair Follicle Health and Growth  Balances Melatonin Levels to Improve Sleep Patterns     START  Vitamin D2 50,000 units weekly.  Vitamin D3 5000 unit capsules daily.  Recheck your Vitamin D level in 3 months - 9/3/2025    Orders:    Vitamin D 25 hydroxy; Future    Vitamin D 25 hydroxy; Future    Cholecalciferol (Vitamin D3) 125 MCG (5000 UT) CAPS; Take 1 capsule (5,000 Units total) by mouth in the morning    ergocalciferol (VITAMIN D2) 50,000 units; Take 1 capsule (50,000 Units total) by mouth once a week    Vitamin D 25 hydroxy; Future

## 2025-06-03 NOTE — PATIENT INSTRUCTIONS
Medicare Preventive Visit Patient Instructions  Thank you for completing your Welcome to Medicare Visit or Medicare Annual Wellness Visit today. Your next wellness visit will be due in one year (6/4/2026).  The screening/preventive services that you may require over the next 5-10 years are detailed below. Some tests may not apply to you based off risk factors and/or age. Screening tests ordered at today's visit but not completed yet may show as past due. Also, please note that scanned in results may not display below.  Preventive Screenings:  Service Recommendations Previous Testing/Comments   Colorectal Cancer Screening  Colonoscopy    Fecal Occult Blood Test (FOBT)/Fecal Immunochemical Test (FIT)  Fecal DNA/Cologuard Test  Flexible Sigmoidoscopy Age: 45-75 years old   Colonoscopy: every 10 years (May be performed more frequently if at higher risk)  OR  FOBT/FIT: every 1 year  OR  Cologuard: every 3 years  OR  Sigmoidoscopy: every 5 years  Screening may be recommended earlier than age 45 if at higher risk for colorectal cancer. Also, an individualized decision between you and your healthcare provider will decide whether screening between the ages of 76-85 would be appropriate. Colonoscopy: 04/24/2023  FOBT/FIT: Not on file  Cologuard: 03/08/2023  Sigmoidoscopy: Not on file    Screening Current     Prostate Cancer Screening Individualized decision between patient and health care provider in men between ages of 55-69   Medicare will cover every 12 months beginning on the day after your 50th birthday PSA: 0.94 ng/mL           Hepatitis C Screening Once for adults born between 1945 and 1965  More frequently in patients at high risk for Hepatitis C Hep C Antibody: 01/24/2025    Screening Not Indicated  History Hepatitis C   Diabetes Screening 1-2 times per year if you're at risk for diabetes or have pre-diabetes Fasting glucose: 93 mg/dL (1/24/2025)  A1C: 5.5 % (4/26/2024)  Screening Current   Cholesterol Screening Once  every 5 years if you don't have a lipid disorder. May order more often based on risk factors. Lipid panel: 04/26/2024  Screening Not Indicated  History Lipid Disorder      Other Preventive Screenings Covered by Medicare:  Abdominal Aortic Aneurysm (AAA) Screening: covered once if your at risk. You're considered to be at risk if you have a family history of AAA or a male between the age of 65-75 who smoking at least 100 cigarettes in your lifetime.  Lung Cancer Screening: covers low dose CT scan once per year if you meet all of the following conditions: (1) Age 55-77; (2) No signs or symptoms of lung cancer; (3) Current smoker or have quit smoking within the last 15 years; (4) You have a tobacco smoking history of at least 20 pack years (packs per day x number of years you smoked); (5) You get a written order from a healthcare provider.  Glaucoma Screening: covered annually if you're considered high risk: (1) You have diabetes OR (2) Family history of glaucoma OR (3)  aged 50 and older OR (4)  American aged 65 and older  Osteoporosis Screening: covered every 2 years if you meet one of the following conditions: (1) Have a vertebral abnormality; (2) On glucocorticoid therapy for more than 3 months; (3) Have primary hyperparathyroidism; (4) On osteoporosis medications and need to assess response to drug therapy.  HIV Screening: covered annually if you're between the age of 15-65. Also covered annually if you are younger than 15 and older than 65 with risk factors for HIV infection. For pregnant patients, it is covered up to 3 times per pregnancy.    Immunizations:  Immunization Recommendations   Influenza Vaccine Annual influenza vaccination during flu season is recommended for all persons aged >= 6 months who do not have contraindications   Pneumococcal Vaccine   * Pneumococcal conjugate vaccine = PCV13 (Prevnar 13), PCV15 (Vaxneuvance), PCV20 (Prevnar 20)  * Pneumococcal polysaccharide vaccine  = PPSV23 (Pneumovax) Adults 19-63 yo with certain risk factors or if 65+ yo  If never received any pneumonia vaccine: recommend Prevnar 20 (PCV20)  Give PCV20 if previously received 1 dose of PCV13 or PPSV23   Hepatitis B Vaccine 3 dose series if at intermediate or high risk (ex: diabetes, end stage renal disease, liver disease)   Respiratory syncytial virus (RSV) Vaccine - COVERED BY MEDICARE PART D  * RSVPreF3 (Arexvy) CDC recommends that adults 60 years of age and older may receive a single dose of RSV vaccine using shared clinical decision-making (SCDM)   Tetanus (Td) Vaccine - COST NOT COVERED BY MEDICARE PART B Following completion of primary series, a booster dose should be given every 10 years to maintain immunity against tetanus. Td may also be given as tetanus wound prophylaxis.   Tdap Vaccine - COST NOT COVERED BY MEDICARE PART B Recommended at least once for all adults. For pregnant patients, recommended with each pregnancy.   Shingles Vaccine (Shingrix) - COST NOT COVERED BY MEDICARE PART B  2 shot series recommended in those 19 years and older who have or will have weakened immune systems or those 50 years and older     Health Maintenance Due:      Topic Date Due   • Colorectal Cancer Screening  04/24/2026   • HIV Screening  Completed   • Hepatitis C Screening  Discontinued     Immunizations Due:      Topic Date Due   • Hepatitis A Vaccine (2 of 2 - Risk 2-dose series) 05/14/2016   • Hepatitis B Vaccine (2 of 3 - Risk 3-dose series) 06/16/2016   • COVID-19 Vaccine (4 - 2024-25 season) 09/01/2024     Advance Directives   What are advance directives?  Advance directives are legal documents that state your wishes and plans for medical care. These plans are made ahead of time in case you lose your ability to make decisions for yourself. Advance directives can apply to any medical decision, such as the treatments you want, and if you want to donate organs.   What are the types of advance directives?  There  are many types of advance directives, and each state has rules about how to use them. You may choose a combination of any of the following:  Living will:  This is a written record of the treatment you want. You can also choose which treatments you do not want, which to limit, and which to stop at a certain time. This includes surgery, medicine, IV fluid, and tube feedings.   Durable power of  for healthcare (DPAHC):  This is a written record that states who you want to make healthcare choices for you when you are unable to make them for yourself. This person, called a proxy, is usually a family member or a friend. You may choose more than 1 proxy.  Do not resuscitate (DNR) order:  A DNR order is used in case your heart stops beating or you stop breathing. It is a request not to have certain forms of treatment, such as CPR. A DNR order may be included in other types of advance directives.  Medical directive:  This covers the care that you want if you are in a coma, near death, or unable to make decisions for yourself. You can list the treatments you want for each condition. Treatment may include pain medicine, surgery, blood transfusions, dialysis, IV or tube feedings, and a ventilator (breathing machine).  Values history:  This document has questions about your views, beliefs, and how you feel and think about life. This information can help others choose the care that you would choose.  Why are advance directives important?  An advance directive helps you control your care. Although spoken wishes may be used, it is better to have your wishes written down. Spoken wishes can be misunderstood, or not followed. Treatments may be given even if you do not want them. An advance directive may make it easier for your family to make difficult choices about your care.   Weight Management   Why it is important to manage your weight:  Being overweight increases your risk of health conditions such as heart disease, high  blood pressure, type 2 diabetes, and certain types of cancer. It can also increase your risk for osteoarthritis, sleep apnea, and other respiratory problems. Aim for a slow, steady weight loss. Even a small amount of weight loss can lower your risk of health problems.  How to lose weight safely:  A safe and healthy way to lose weight is to eat fewer calories and get regular exercise. You can lose up about 1 pound a week by decreasing the number of calories you eat by 500 calories each day.   Healthy meal plan for weight management:  A healthy meal plan includes a variety of foods, contains fewer calories, and helps you stay healthy. A healthy meal plan includes the following:  Eat whole-grain foods more often.  A healthy meal plan should contain fiber. Fiber is the part of grains, fruits, and vegetables that is not broken down by your body. Whole-grain foods are healthy and provide extra fiber in your diet. Some examples of whole-grain foods are whole-wheat breads and pastas, oatmeal, brown rice, and bulgur.  Eat a variety of vegetables every day.  Include dark, leafy greens such as spinach, kale, va greens, and mustard greens. Eat yellow and orange vegetables such as carrots, sweet potatoes, and winter squash.   Eat a variety of fruits every day.  Choose fresh or canned fruit (canned in its own juice or light syrup) instead of juice. Fruit juice has very little or no fiber.  Eat low-fat dairy foods.  Drink fat-free (skim) milk or 1% milk. Eat fat-free yogurt and low-fat cottage cheese. Try low-fat cheeses such as mozzarella and other reduced-fat cheeses.  Choose meat and other protein foods that are low in fat.  Choose beans or other legumes such as split peas or lentils. Choose fish, skinless poultry (chicken or turkey), or lean cuts of red meat (beef or pork). Before you cook meat or poultry, cut off any visible fat.   Use less fat and oil.  Try baking foods instead of frying them. Add less fat, such as  margarine, sour cream, regular salad dressing and mayonnaise to foods. Eat fewer high-fat foods. Some examples of high-fat foods include french fries, doughnuts, ice cream, and cakes.  Eat fewer sweets.  Limit foods and drinks that are high in sugar. This includes candy, cookies, regular soda, and sweetened drinks.  Exercise:  Exercise at least 30 minutes per day on most days of the week. Some examples of exercise include walking, biking, dancing, and swimming. You can also fit in more physical activity by taking the stairs instead of the elevator or parking farther away from stores. Ask your healthcare provider about the best exercise plan for you.   Narcotic (Opioid) Safety    Use narcotics safely:  Take prescribed narcotics exactly as directed  Do not give narcotics to others or take narcotics that belong to someone else  Do not mix narcotics without medicines or alcohol  Do not drive or operate heavy machinery after you take the narcotic  Monitor for side effects and notify your healthcare provider if you experienced side effects such as nausea, sleepiness, itching, or trouble thinking clearly.    Manage constipation:    Constipation is the most common side effect of narcotic medicine. Constipation is when you have hard, dry bowel movements, or you go longer than usual between bowel movements. Tell your healthcare provider about all changes in your bowel movements while you are taking narcotics. He or she may recommend laxative medicine to help you have a bowel movement. He or she may also change the kind of narcotic you are taking, or change when you take it. The following are more ways you can prevent or relieve constipation:    Drink liquids as directed.  You may need to drink extra liquids to help soften and move your bowels. Ask how much liquid to drink each day and which liquids are best for you.  Eat high-fiber foods.  This may help decrease constipation by adding bulk to your bowel movements. High-fiber  foods include fruits, vegetables, whole-grain breads and cereals, and beans. Your healthcare provider or dietitian can help you create a high-fiber meal plan. Your provider may also recommend a fiber supplement if you cannot get enough fiber from food.  Exercise regularly.  Regular physical activity can help stimulate your intestines. Walking is a good exercise to prevent or relieve constipation. Ask which exercises are best for you.  Schedule a time each day to have a bowel movement.  This may help train your body to have regular bowel movements. Bend forward while you are on the toilet to help move the bowel movement out. Sit on the toilet for at least 10 minutes, even if you do not have a bowel movement.    Store narcotics safely:   Store narcotics where others cannot easily get them.  Keep them in a locked cabinet or secure area. Do not  keep them in a purse or other bag you carry with you. A person may be looking for something else and find the narcotics.  Make sure narcotics are stored out of the reach of children.  A child can easily overdose on narcotics. Narcotics may look like candy to a small child.    The best way to dispose of narcotics:      The laws vary by country and area. In the [place], the best way is to return the narcotics through a take-back program. This program is offered by the [place] Drug Enforcement Agency (MICHAEL). The following are options for using the program:  Take the narcotics to a MICHAEL collection site.  The site is often a law enforcement center. Call your local law enforcement center for scheduled take-back days in your area. You will be given information on where to go if the collection site is in a different location.  Take the narcotics to an approved pharmacy or hospital.  A pharmacy or hospital may be set up as a collection site. You will need to ask if it is a MICHAEL collection site if you were not directed there. A pharmacy or doctor's office may not be able to take back  narcotics unless it is a MICHAEL site.  Use a mail-back system.  This means you are given containers to put the narcotics into. You will then mail them in the containers.  Use a take-back drop box.  This is a place to leave the narcotics at any time. People and animals will not be able to get into the box. Your local law enforcement agency can tell you where to find a drop box in your area.    Other ways to manage pain:   Ask your healthcare provider about non-narcotic medicines to control pain.  Nonprescription medicines include NSAIDs (such as ibuprofen) and acetaminophen. Prescription medicines include muscle relaxers, antidepressants, and steroids.  Pain may be managed without any medicines.  Some ways to relieve pain include massage, aromatherapy, or meditation. Physical or occupational therapy may also help.    For more information:   Drug Enforcement Administration  [address] [place] [address]  Phone: 8- 238 - [phone]  Web Address: https://www.deadiversion.[facility].gov/drug_disposal/     Food and Drug Administration  [address] [place] [address]  Phone: 8- 527 - 942-9274  Web Address: http://www.fda.gov   © Copyright [employer] 2018 Information is for End User's use only and may not be sold, redistributed or otherwise used for commercial purposes. All illustrations and images included in CareNotes® are the copyrighted property of [employer] or [employer]

## 2025-06-03 NOTE — ASSESSMENT & PLAN NOTE
Orders:    Hemoglobin A1C; Future    Lipid Panel with Direct LDL reflex; Future    TSH, 3rd generation with Free T4 reflex; Future    Vitamin D 25 hydroxy; Future    Hemoglobin A1C; Future    Lipid Panel with Direct LDL reflex; Future    TSH, 3rd generation with Free T4 reflex; Future    Vitamin D 25 hydroxy; Future    Vitamin D 25 hydroxy; Future

## 2025-06-03 NOTE — PROGRESS NOTES
Name: Donnie Amato      : 1965      MRN: [id number]  Encounter Provider: LANA Barclay  Encounter Date: 6/3/2025   Encounter department: [facility]  :  Assessment & Plan  Encounter for immunization    Orders:    HEPATITIS A VACCINE ADULT IM    HEPATITIS B VACCINE ADULT IM    Annual physical exam    Orders:    CBC and differential; Future    Comprehensive metabolic panel; Future    CBC and differential; Future    Comprehensive metabolic panel; Future    Impaired fasting glucose    Orders:    Hemoglobin A1C; Future    Hemoglobin A1C; Future    On long term drug therapy    Orders:    Hemoglobin A1C; Future    Lipid Panel with Direct LDL reflex; Future    TSH, 3rd generation with Free T4 reflex; Future    Vitamin D 25 hydroxy; Future    Hemoglobin A1C; Future    Lipid Panel with Direct LDL reflex; Future    TSH, 3rd generation with Free T4 reflex; Future    Vitamin D 25 hydroxy; Future    Vitamin D 25 hydroxy; Future    Pure hypercholesterolemia    Orders:    Lipid Panel with Direct LDL reflex; Future    Lipid Panel with Direct LDL reflex; Future    Disorders of magnesium metabolism, unspecified    Orders:    Magnesium; Future    Magnesium; Future    Screening for malignant neoplasm of prostate    Orders:    PSA, Total Screen; Future    Abnormal results of thyroid function studies    Orders:    TSH, 3rd generation with Free T4 reflex; Future    TSH, 3rd generation with Free T4 reflex; Future    Vitamin B12 deficiency (dietary) anemia      Orders:    Vitamin B12; Future    Vitamin B12; Future    Vitamin D deficiency  Vitamin D   Your Vitamin D level should be close to 100.  Helps with:  Decreasing Fatigue - Improves Energy  Fights Depression  Decreases Anxiety  Is a Neurotransporter Increasing Serotonin Levels  Improves Bone Health and Helps Prevent Osteoporosis  Is an Essential Nutrient  You Can ONLY Absorb the Proper Amount of Calcium When You Have the Correct Vitamin D  Level  Decreases Wrinkles and Fine Lines by working with Collagen Production  Improves Immune Health  Muscle Strength  Brain Cells - To Decrease Brain Fog  Decreases Inflammation  Improved Glucose Metabolism - Helping With Weight Loss  Hair Follicle Health and Growth  Balances Melatonin Levels to Improve Sleep Patterns     START  Vitamin D2 50,000 units weekly.  Vitamin D3 5000 unit capsules daily.  Recheck your Vitamin D level in 3 months - 9/3/2025    Orders:    Vitamin D 25 hydroxy; Future    Vitamin D 25 hydroxy; Future    Cholecalciferol (Vitamin D3) 125 MCG (5000 UT) CAPS; Take 1 capsule (5,000 Units total) by mouth in the morning    ergocalciferol (VITAMIN D2) 50,000 units; Take 1 capsule (50,000 Units total) by mouth once a week    Vitamin D 25 hydroxy; Future    Medicare annual wellness visit, subsequent         Deluna's esophagus with dysplasia         Gastroesophageal reflux disease without esophagitis         Metabolic dysfunction-associated steatotic liver disease (MASLD)         Chronic bilateral back pain, unspecified back location         Chronic pain of both knees         Poisoning by 4-aminophenol derivatives, undetermined intent, sequela    Orders:    Testosterone, free, total; Future    Testosterone, Free, Direct; Future    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 They screened negative with a PHQ-2 score of 0.        Preventive health issues were discussed with patient, and age appropriate screening tests were ordered as noted in patient's After Visit Summary. Personalized health advice and appropriate referrals for health education or preventive  services given if needed, as noted in patient's After Visit Summary.    History of Present Illness     The patient is here today to discuss his medicare annual wellness visit.   I reviewed their medical conditions, medications, laboratory and radiology studies.  I reviewed their scheduled future lab studies with the patient.   The patient's current treatment plan is effective.   There is no change in the current therapy.   I ask the patient to continue their current therapy.   The patient voiced their understanding and agreement.   Follow up in 6 months .  Please continue to the PORCH section of the note for details of today's visit.              Patient Care Team:  LANA Barclay as PCP - General (Internal Medicine)  Donato Triana DO (Gastroenterology)    Review of Systems   Constitutional:  Negative for activity change, chills, fatigue and fever.   HENT:  Negative for rhinorrhea and sore throat.    Eyes:  Negative for pain.   Respiratory:  Negative for cough and shortness of breath.    Cardiovascular:  Negative for chest pain, palpitations and leg swelling.   Gastrointestinal:  Negative for abdominal pain, constipation, diarrhea, nausea and vomiting.   Genitourinary:  Negative for difficulty urinating, flank pain, frequency and urgency.   Musculoskeletal:  Negative for gait problem, joint swelling and myalgias.   Skin:  Negative for color change.   Neurological:  Negative for dizziness, weakness, light-headedness and headaches.   Psychiatric/Behavioral:  Negative for sleep disturbance. The patient is not nervous/anxious.    All other systems reviewed and are negative.    Medical History Reviewed by provider this encounter:  Tobacco  Allergies  Meds  Problems  Med Hx  Surg Hx  Fam Hx       Annual Wellness Visit Questionnaire   Donnie is here for his Subsequent Wellness visit. Last Medicare Wellness visit information reviewed, patient interviewed and updates made to the record today.       Health Risk Assessment:   Patient rates overall health as fair. Patient feels that their physical health rating is same. Patient is satisfied with their life. Eyesight was rated as same. Hearing was rated as slightly worse. Patient feels that their emotional and mental health rating is same. Patients states they are never, rarely angry. Patient states they are never, rarely unusually tired/fatigued. Pain experienced in the last 7 days has been some. Patient's pain rating has been 5/10. Patient states that he has experienced no weight loss or gain in last 6 months. Back pain.    Depression Screening:   PHQ-2 Score: 0  PHQ-9 Score: 0      Fall Risk Screening:   In the past year, patient has experienced: history of falling in past year    Number of falls: 1  Injured during fall?: Yes    Feels unsteady when standing or walking?: No    Worried about falling?: No      Home Safety:  Patient does not have trouble with stairs inside or outside of their home. Patient has working smoke alarms and has working carbon monoxide detector. Home safety hazards include: none.     Nutrition:   Current diet is Regular.     Medications:   Patient is not currently taking any over-the-counter supplements. Patient is able to manage medications.     Activities of Daily Living (ADLs)/Instrumental Activities of Daily Living (IADLs):   Walk and transfer into and out of bed and chair?: Yes  Dress and groom yourself?: Yes    Bathe or shower yourself?: Yes    Feed yourself? Yes  Do your laundry/housekeeping?: Yes  Manage your money, pay your bills and track your expenses?: Yes  Make your own meals?: Yes    Do your own shopping?: Yes    Previous Hospitalizations:   Any hospitalizations or ED visits within the last 12 months?: No      Advance Care Planning:   Living will: No    Durable POA for healthcare: No    Advanced directive: No    Advanced directive counseling given: Yes    ACP document given: Yes    Patient declined ACP directive: No       Cognitive Screening:   Provider or family/friend/caregiver concerned regarding cognition?: No    Preventive Screenings      Cardiovascular Screening:    General: Screening Not Indicated and History Lipid Disorder      Diabetes Screening:     General: Screening Current      Colorectal Cancer Screening:     General: Screening Current      Abdominal Aortic Aneurysm (AAA) Screening:    Risk factors include: tobacco use        Lung Cancer Screening:     General: Screening Not Indicated      Hepatitis C Screening:    General: Screening Not Indicated and History Hepatitis C    Immunizations:  - Immunizations due: Hepatitis A and Hepatitis B    Screening, Brief Intervention, and Referral to Treatment (SBIRT)     Screening  Typical number of drinks in a day: 0  Typical number of drinks in a week: 0  Interpretation: Low risk drinking behavior.    Single Item Drug Screening:  How often have you used an illegal drug (including marijuana) or a prescription medication for non-medical reasons in the past year? never    Single Item Drug Screen Score: 0  Interpretation: Negative screen for possible drug use disorder    Review of Current Opioid Use    Opioid Risk Tool (ORT) Interpretation: Complete Opioid Risk Tool (ORT)    [place]MP or [place]  reviewed. No red flags were identified    Other Counseling Topics:   Car/seat belt/driving safety, skin self-exam, sunscreen and calcium and vitamin D intake and regular weightbearing exercise.     Social Drivers of Health     Financial Resource Strain: Patient Declined (5/30/2023)    Overall Financial Resource Strain (CARDIA)     Difficulty of Paying Living Expenses: Patient declined   Food Insecurity: No Food Insecurity (6/3/2025)    Nursing - Inadequate Food Risk Classification     Worried About Running Out of Food in the Last Year: Never true     Ran Out of Food in the Last Year: Never true   Transportation Needs: No Transportation Needs (6/3/2025)    PRAPARE - Transportation     Lack of  "Transportation (Medical): No     Lack of Transportation (Non-Medical): No   Housing Stability: Low Risk  (6/3/2025)    Housing Stability Vital Sign     Unable to Pay for Housing in the Last Year: No     Number of Times Moved in the Last Year: 0     Homeless in the Last Year: No   Utilities: Not At Risk (6/3/2025)    [facility] Utilities     Threatened with loss of utilities: No     No results found.    Objective   /82 (BP Location: Left arm, Patient Position: Sitting, Cuff Size: Large)   Pulse 81   Temp (!) 97.2 °F (36.2 °C) (Tympanic)   Resp 16   Ht 6' 1\" (1.854 m)   Wt 114 kg (252 lb 3.2 oz)   SpO2 96%   BMI 33.27 kg/m²     Physical Exam  Vitals and nursing note reviewed.   Constitutional:       General: He is awake.      Appearance: Normal appearance. He is well-developed.   HENT:      Head: Normocephalic and atraumatic.      Nose: Nose normal.      Mouth/Throat:      Mouth: Mucous membranes are moist.     Eyes:      Conjunctiva/sclera: Conjunctivae normal.       Cardiovascular:      Rate and Rhythm: Normal rate and regular rhythm.      Pulses: Normal pulses.      Heart sounds: Normal heart sounds. No murmur heard.  Pulmonary:      Effort: Pulmonary effort is normal. No respiratory distress.      Breath sounds: Normal breath sounds.   Abdominal:      General: Bowel sounds are normal.      Palpations: Abdomen is soft.      Tenderness: There is no abdominal tenderness.     Musculoskeletal:      Cervical back: Neck supple.      Right lower leg: No edema.      Left lower leg: No edema.     Skin:     General: Skin is warm and dry.     Neurological:      Mental Status: He is alert and oriented to person, place, and time.      Motor: Motor function is intact.      Coordination: Coordination is intact.      Gait: Gait is intact.     Psychiatric:         Attention and Perception: Attention normal.         Mood and Affect: Mood normal.         Speech: Speech normal.         Behavior: Behavior normal. Behavior is " cooperative.         Thought Content: Thought content normal.         Cognition and Memory: Cognition normal.         Judgment: Judgment normal.       Administrative Statements   I have spent a total time of 40 minutes in caring for this patient on the day of the visit/encounter including Diagnostic results, Prognosis, Risks and benefits of tx options, Instructions for management, Patient and family education, Importance of tx compliance, Risk factor reductions, Impressions, Counseling / Coordination of care, Documenting in the medical record, Reviewing/placing orders in the medical record (including tests, medications, and/or procedures), and Obtaining or reviewing history  .

## 2025-06-09 ENCOUNTER — TELEPHONE (OUTPATIENT)
Age: 60
End: 2025-06-09

## 2025-06-11 ENCOUNTER — APPOINTMENT (OUTPATIENT)
Dept: LAB | Facility: CLINIC | Age: 60
End: 2025-06-11
Attending: UROLOGY
Payer: COMMERCIAL

## 2025-06-11 DIAGNOSIS — Z12.5 SCREENING FOR PROSTATE CANCER: ICD-10-CM

## 2025-06-11 DIAGNOSIS — Z00.00 ANNUAL PHYSICAL EXAM: ICD-10-CM

## 2025-06-11 DIAGNOSIS — D51.8 VITAMIN B12 DEFICIENCY (DIETARY) ANEMIA: ICD-10-CM

## 2025-06-11 DIAGNOSIS — R73.01 IMPAIRED FASTING GLUCOSE: ICD-10-CM

## 2025-06-11 DIAGNOSIS — R94.6 ABNORMAL RESULTS OF THYROID FUNCTION STUDIES: ICD-10-CM

## 2025-06-11 DIAGNOSIS — Z79.899 ON LONG TERM DRUG THERAPY: ICD-10-CM

## 2025-06-11 DIAGNOSIS — E78.00 PURE HYPERCHOLESTEROLEMIA: ICD-10-CM

## 2025-06-11 DIAGNOSIS — E83.40 DISORDERS OF MAGNESIUM METABOLISM, UNSPECIFIED: ICD-10-CM

## 2025-06-11 DIAGNOSIS — E55.9 VITAMIN D DEFICIENCY: ICD-10-CM

## 2025-06-11 LAB
25(OH)D3 SERPL-MCNC: 48.1 NG/ML (ref 30–100)
ALBUMIN SERPL BCG-MCNC: 5 G/DL (ref 3.5–5)
ALP SERPL-CCNC: 40 U/L (ref 34–104)
ALT SERPL W P-5'-P-CCNC: 39 U/L (ref 7–52)
ANION GAP SERPL CALCULATED.3IONS-SCNC: 11 MMOL/L (ref 4–13)
AST SERPL W P-5'-P-CCNC: 31 U/L (ref 13–39)
BASOPHILS # BLD AUTO: 0.03 THOUSANDS/ÂΜL (ref 0–0.1)
BASOPHILS NFR BLD AUTO: 0 % (ref 0–1)
BILIRUB SERPL-MCNC: 0.54 MG/DL (ref 0.2–1)
BUN SERPL-MCNC: 17 MG/DL (ref 5–25)
CALCIUM SERPL-MCNC: 10 MG/DL (ref 8.4–10.2)
CHLORIDE SERPL-SCNC: 106 MMOL/L (ref 96–108)
CHOLEST SERPL-MCNC: 182 MG/DL (ref ?–200)
CO2 SERPL-SCNC: 24 MMOL/L (ref 21–32)
CREAT SERPL-MCNC: 0.84 MG/DL (ref 0.6–1.3)
EOSINOPHIL # BLD AUTO: 0.14 THOUSAND/ÂΜL (ref 0–0.61)
EOSINOPHIL NFR BLD AUTO: 2 % (ref 0–6)
ERYTHROCYTE [DISTWIDTH] IN BLOOD BY AUTOMATED COUNT: 13.2 % (ref 11.6–15.1)
GFR SERPL CREATININE-BSD FRML MDRD: 94 ML/MIN/1.73SQ M
GLUCOSE P FAST SERPL-MCNC: 88 MG/DL (ref 65–99)
HCT VFR BLD AUTO: 45.2 % (ref 36.5–49.3)
HDLC SERPL-MCNC: 50 MG/DL
HGB BLD-MCNC: 15.6 G/DL (ref 12–17)
IMM GRANULOCYTES # BLD AUTO: 0.04 THOUSAND/UL (ref 0–0.2)
IMM GRANULOCYTES NFR BLD AUTO: 1 % (ref 0–2)
LDLC SERPL CALC-MCNC: 112 MG/DL (ref 0–100)
LYMPHOCYTES # BLD AUTO: 3.54 THOUSANDS/ÂΜL (ref 0.6–4.47)
LYMPHOCYTES NFR BLD AUTO: 46 % (ref 14–44)
MAGNESIUM SERPL-MCNC: 2.2 MG/DL (ref 1.9–2.7)
MCH RBC QN AUTO: 30.7 PG (ref 26.8–34.3)
MCHC RBC AUTO-ENTMCNC: 34.5 G/DL (ref 31.4–37.4)
MCV RBC AUTO: 89 FL (ref 82–98)
MONOCYTES # BLD AUTO: 0.56 THOUSAND/ÂΜL (ref 0.17–1.22)
MONOCYTES NFR BLD AUTO: 7 % (ref 4–12)
NEUTROPHILS # BLD AUTO: 3.38 THOUSANDS/ÂΜL (ref 1.85–7.62)
NEUTS SEG NFR BLD AUTO: 44 % (ref 43–75)
NRBC BLD AUTO-RTO: 0 /100 WBCS
PLATELET # BLD AUTO: 223 THOUSANDS/UL (ref 149–390)
PMV BLD AUTO: 11.3 FL (ref 8.9–12.7)
POTASSIUM SERPL-SCNC: 4.2 MMOL/L (ref 3.5–5.3)
PROT SERPL-MCNC: 7.8 G/DL (ref 6.4–8.4)
PSA SERPL-MCNC: 0.97 NG/ML (ref 0–4)
RBC # BLD AUTO: 5.08 MILLION/UL (ref 3.88–5.62)
SODIUM SERPL-SCNC: 141 MMOL/L (ref 135–147)
TRIGL SERPL-MCNC: 99 MG/DL (ref ?–150)
TSH SERPL DL<=0.05 MIU/L-ACNC: 2.83 UIU/ML (ref 0.45–4.5)
VIT B12 SERPL-MCNC: 340 PG/ML (ref 180–914)
WBC # BLD AUTO: 7.69 THOUSAND/UL (ref 4.31–10.16)

## 2025-06-11 PROCEDURE — 83735 ASSAY OF MAGNESIUM: CPT

## 2025-06-11 PROCEDURE — 82306 VITAMIN D 25 HYDROXY: CPT

## 2025-06-11 PROCEDURE — 84443 ASSAY THYROID STIM HORMONE: CPT

## 2025-06-11 PROCEDURE — 36415 COLL VENOUS BLD VENIPUNCTURE: CPT

## 2025-06-11 PROCEDURE — 80053 COMPREHEN METABOLIC PANEL: CPT

## 2025-06-11 PROCEDURE — 80061 LIPID PANEL: CPT

## 2025-06-11 PROCEDURE — G0103 PSA SCREENING: HCPCS

## 2025-06-11 PROCEDURE — 83036 HEMOGLOBIN GLYCOSYLATED A1C: CPT

## 2025-06-11 PROCEDURE — 85025 COMPLETE CBC W/AUTO DIFF WBC: CPT

## 2025-06-11 PROCEDURE — 82607 VITAMIN B-12: CPT

## 2025-06-12 LAB
EST. AVERAGE GLUCOSE BLD GHB EST-MCNC: 120 MG/DL
HBA1C MFR BLD: 5.8 %

## 2025-06-13 ENCOUNTER — APPOINTMENT (OUTPATIENT)
Dept: LAB | Facility: HOSPITAL | Age: 60
End: 2025-06-13
Payer: COMMERCIAL

## 2025-06-13 DIAGNOSIS — T39.1X4D: Chronic | ICD-10-CM

## 2025-06-13 DIAGNOSIS — T39.1X4S: Chronic | ICD-10-CM

## 2025-06-13 DIAGNOSIS — N52.1 ERECTILE DYSFUNCTION DUE TO DISEASES CLASSIFIED ELSEWHERE: Chronic | ICD-10-CM

## 2025-06-13 PROCEDURE — 36415 COLL VENOUS BLD VENIPUNCTURE: CPT

## 2025-06-13 PROCEDURE — 84403 ASSAY OF TOTAL TESTOSTERONE: CPT

## 2025-06-13 PROCEDURE — 84402 ASSAY OF FREE TESTOSTERONE: CPT
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
## 2025-06-16 LAB
TESTOST FREE SERPL-MCNC: 8.9 PG/ML (ref 6.6–18.1)
TESTOST SERPL-MCNC: 488 NG/DL (ref 264–916)

## 2025-06-16 RX ORDER — DOXYCYCLINE 100 MG/1
100 CAPSULE ORAL EVERY 12 HOURS SCHEDULED
Qty: 28 CAPSULE | Refills: 0 | Status: SHIPPED | OUTPATIENT
Start: 2025-06-16 | End: 2025-06-30

## 2025-06-18 ENCOUNTER — RESULTS FOLLOW-UP (OUTPATIENT)
Dept: FAMILY MEDICINE CLINIC | Facility: CLINIC | Age: 60
End: 2025-06-18

## 2025-08-13 ENCOUNTER — OFFICE VISIT (OUTPATIENT)
Dept: FAMILY MEDICINE CLINIC | Facility: CLINIC | Age: 60
End: 2025-08-13
Payer: COMMERCIAL

## 2025-08-13 VITALS
WEIGHT: 239.6 LBS | RESPIRATION RATE: 18 BRPM | HEART RATE: 101 BPM | OXYGEN SATURATION: 96 % | SYSTOLIC BLOOD PRESSURE: 124 MMHG | HEIGHT: 73 IN | DIASTOLIC BLOOD PRESSURE: 78 MMHG | BODY MASS INDEX: 31.75 KG/M2 | TEMPERATURE: 100.3 F

## 2025-08-13 DIAGNOSIS — H66.003 NON-RECURRENT ACUTE SUPPURATIVE OTITIS MEDIA OF BOTH EARS WITHOUT SPONTANEOUS RUPTURE OF TYMPANIC MEMBRANES: Primary | Chronic | ICD-10-CM

## 2025-08-13 DIAGNOSIS — J20.9 BRONCHITIS WITH BRONCHOSPASM: Chronic | ICD-10-CM

## 2025-08-13 PROCEDURE — G2211 COMPLEX E/M VISIT ADD ON: HCPCS | Performed by: NURSE PRACTITIONER

## 2025-08-13 PROCEDURE — 99213 OFFICE O/P EST LOW 20 MIN: CPT | Performed by: NURSE PRACTITIONER

## 2025-08-13 RX ORDER — CEFDINIR 300 MG/1
300 CAPSULE ORAL EVERY 12 HOURS SCHEDULED
Qty: 20 CAPSULE | Refills: 0 | Status: SHIPPED | OUTPATIENT
Start: 2025-08-13 | End: 2025-08-23

## 2025-08-13 RX ORDER — AZITHROMYCIN 250 MG/1
TABLET, FILM COATED ORAL
Qty: 6 TABLET | Refills: 0 | Status: SHIPPED | OUTPATIENT
Start: 2025-08-13 | End: 2025-08-18

## 2025-08-13 RX ORDER — FLUCONAZOLE 100 MG/1
100 TABLET ORAL DAILY
Qty: 10 TABLET | Refills: 0 | Status: SHIPPED | OUTPATIENT
Start: 2025-08-13 | End: 2025-08-23

## 2025-08-13 RX ORDER — PREDNISONE 10 MG/1
TABLET ORAL
Qty: 50 TABLET | Refills: 0 | Status: SHIPPED | OUTPATIENT
Start: 2025-08-13 | End: 2025-09-02

## 2025-08-13 RX ORDER — BENZONATATE 200 MG/1
200 CAPSULE ORAL 3 TIMES DAILY PRN
Qty: 90 CAPSULE | Refills: 0 | Status: SHIPPED | OUTPATIENT
Start: 2025-08-13